# Patient Record
Sex: MALE | Race: WHITE | Employment: UNEMPLOYED | URBAN - METROPOLITAN AREA
[De-identification: names, ages, dates, MRNs, and addresses within clinical notes are randomized per-mention and may not be internally consistent; named-entity substitution may affect disease eponyms.]

---

## 2022-02-06 ENCOUNTER — APPOINTMENT (OUTPATIENT)
Dept: GENERAL RADIOLOGY | Age: 57
DRG: 280 | End: 2022-02-06
Payer: MEDICAID

## 2022-02-06 ENCOUNTER — APPOINTMENT (OUTPATIENT)
Dept: CT IMAGING | Age: 57
DRG: 280 | End: 2022-02-06
Payer: MEDICAID

## 2022-02-06 ENCOUNTER — HOSPITAL ENCOUNTER (INPATIENT)
Age: 57
LOS: 3 days | Discharge: HOME | DRG: 280 | End: 2022-02-09
Attending: EMERGENCY MEDICINE | Admitting: INTERNAL MEDICINE
Payer: MEDICAID

## 2022-02-06 DIAGNOSIS — F10.931 ALCOHOL WITHDRAWAL SYNDROME, WITH DELIRIUM (HCC): ICD-10-CM

## 2022-02-06 DIAGNOSIS — K70.31 ALCOHOLIC CIRRHOSIS OF LIVER WITH ASCITES (HCC): Primary | ICD-10-CM

## 2022-02-06 DIAGNOSIS — R19.7 DIARRHEA, UNSPECIFIED TYPE: ICD-10-CM

## 2022-02-06 PROBLEM — F10.10 ETOH ABUSE: Status: ACTIVE | Noted: 2022-02-06

## 2022-02-06 PROBLEM — I10 PRIMARY HYPERTENSION: Status: ACTIVE | Noted: 2022-02-06

## 2022-02-06 LAB
ALBUMIN SERPL-MCNC: 3 G/DL (ref 3.5–5.2)
ALP BLD-CCNC: 126 U/L (ref 40–129)
ALT SERPL-CCNC: 17 U/L (ref 0–40)
AMMONIA: 24 UMOL/L (ref 16–60)
ANION GAP SERPL CALCULATED.3IONS-SCNC: 11 MMOL/L (ref 7–16)
AST SERPL-CCNC: 67 U/L (ref 0–39)
BASOPHILS ABSOLUTE: 0.07 E9/L (ref 0–0.2)
BASOPHILS RELATIVE PERCENT: 1 % (ref 0–2)
BILIRUB SERPL-MCNC: 2.8 MG/DL (ref 0–1.2)
BUN BLDV-MCNC: 7 MG/DL (ref 6–20)
CALCIUM SERPL-MCNC: 8.6 MG/DL (ref 8.6–10.2)
CHLORIDE BLD-SCNC: 98 MMOL/L (ref 98–107)
CO2: 22 MMOL/L (ref 22–29)
CREAT SERPL-MCNC: 0.7 MG/DL (ref 0.7–1.2)
EOSINOPHILS ABSOLUTE: 0.21 E9/L (ref 0.05–0.5)
EOSINOPHILS RELATIVE PERCENT: 3 % (ref 0–6)
FERRITIN: 1444 NG/ML
GFR AFRICAN AMERICAN: >60
GFR NON-AFRICAN AMERICAN: >60 ML/MIN/1.73
GLUCOSE BLD-MCNC: 105 MG/DL (ref 74–99)
HCT VFR BLD CALC: 39.4 % (ref 37–54)
HEMOGLOBIN: 13.4 G/DL (ref 12.5–16.5)
INR BLD: 1.5
IRON SATURATION: 57 % (ref 20–55)
IRON: 62 MCG/DL (ref 59–158)
LACTIC ACID: 2.6 MMOL/L (ref 0.5–2.2)
LIPASE: 23 U/L (ref 13–60)
LYMPHOCYTES ABSOLUTE: 1.42 E9/L (ref 1.5–4)
LYMPHOCYTES RELATIVE PERCENT: 20 % (ref 20–42)
MCH RBC QN AUTO: 34.7 PG (ref 26–35)
MCHC RBC AUTO-ENTMCNC: 34 % (ref 32–34.5)
MCV RBC AUTO: 102.1 FL (ref 80–99.9)
MONOCYTES ABSOLUTE: 0.57 E9/L (ref 0.1–0.95)
MONOCYTES RELATIVE PERCENT: 8 % (ref 2–12)
NEUTROPHILS ABSOLUTE: 4.83 E9/L (ref 1.8–7.3)
NEUTROPHILS RELATIVE PERCENT: 68 % (ref 43–80)
PDW BLD-RTO: 13.6 FL (ref 11.5–15)
PLATELET # BLD: 146 E9/L (ref 130–450)
PMV BLD AUTO: 10.7 FL (ref 7–12)
POTASSIUM SERPL-SCNC: 4.6 MMOL/L (ref 3.5–5)
PROTHROMBIN TIME: 17.9 SEC (ref 9.3–12.4)
RBC # BLD: 3.86 E12/L (ref 3.8–5.8)
REASON FOR REJECTION: NORMAL
REJECTED TEST: NORMAL
SARS-COV-2, NAAT: NOT DETECTED
SODIUM BLD-SCNC: 131 MMOL/L (ref 132–146)
T4 FREE: 1.09 NG/DL (ref 0.93–1.7)
TOTAL IRON BINDING CAPACITY: 108 MCG/DL (ref 250–450)
TOTAL PROTEIN: 8.5 G/DL (ref 6.4–8.3)
TROPONIN, HIGH SENSITIVITY: 7 NG/L (ref 0–11)
TSH SERPL DL<=0.05 MIU/L-ACNC: 5.35 UIU/ML (ref 0.27–4.2)
VITAMIN D 25-HYDROXY: 38 NG/ML (ref 30–100)
WBC # BLD: 7.1 E9/L (ref 4.5–11.5)

## 2022-02-06 PROCEDURE — 86706 HEP B SURFACE ANTIBODY: CPT

## 2022-02-06 PROCEDURE — 84484 ASSAY OF TROPONIN QUANT: CPT

## 2022-02-06 PROCEDURE — 6370000000 HC RX 637 (ALT 250 FOR IP): Performed by: PHYSICIAN ASSISTANT

## 2022-02-06 PROCEDURE — 82105 ALPHA-FETOPROTEIN SERUM: CPT

## 2022-02-06 PROCEDURE — 85025 COMPLETE CBC W/AUTO DIFF WBC: CPT

## 2022-02-06 PROCEDURE — 87635 SARS-COV-2 COVID-19 AMP PRB: CPT

## 2022-02-06 PROCEDURE — 96374 THER/PROPH/DIAG INJ IV PUSH: CPT

## 2022-02-06 PROCEDURE — 80053 COMPREHEN METABOLIC PANEL: CPT

## 2022-02-06 PROCEDURE — 82390 ASSAY OF CERULOPLASMIN: CPT

## 2022-02-06 PROCEDURE — 6360000002 HC RX W HCPCS: Performed by: PHYSICIAN ASSISTANT

## 2022-02-06 PROCEDURE — C9113 INJ PANTOPRAZOLE SODIUM, VIA: HCPCS | Performed by: PHYSICIAN ASSISTANT

## 2022-02-06 PROCEDURE — 82140 ASSAY OF AMMONIA: CPT

## 2022-02-06 PROCEDURE — 83690 ASSAY OF LIPASE: CPT

## 2022-02-06 PROCEDURE — 82728 ASSAY OF FERRITIN: CPT

## 2022-02-06 PROCEDURE — 6370000000 HC RX 637 (ALT 250 FOR IP): Performed by: EMERGENCY MEDICINE

## 2022-02-06 PROCEDURE — 6360000004 HC RX CONTRAST MEDICATION: Performed by: RADIOLOGY

## 2022-02-06 PROCEDURE — 71045 X-RAY EXAM CHEST 1 VIEW: CPT

## 2022-02-06 PROCEDURE — APPSS60 APP SPLIT SHARED TIME 46-60 MINUTES: Performed by: PHYSICIAN ASSISTANT

## 2022-02-06 PROCEDURE — 1200000000 HC SEMI PRIVATE

## 2022-02-06 PROCEDURE — 6360000002 HC RX W HCPCS: Performed by: EMERGENCY MEDICINE

## 2022-02-06 PROCEDURE — 84439 ASSAY OF FREE THYROXINE: CPT

## 2022-02-06 PROCEDURE — 87350 HEPATITIS BE AG IA: CPT

## 2022-02-06 PROCEDURE — 2580000003 HC RX 258: Performed by: PHYSICIAN ASSISTANT

## 2022-02-06 PROCEDURE — 83605 ASSAY OF LACTIC ACID: CPT

## 2022-02-06 PROCEDURE — 83540 ASSAY OF IRON: CPT

## 2022-02-06 PROCEDURE — 2580000003 HC RX 258: Performed by: EMERGENCY MEDICINE

## 2022-02-06 PROCEDURE — 82103 ALPHA-1-ANTITRYPSIN TOTAL: CPT

## 2022-02-06 PROCEDURE — 80074 ACUTE HEPATITIS PANEL: CPT

## 2022-02-06 PROCEDURE — 74177 CT ABD & PELVIS W/CONTRAST: CPT

## 2022-02-06 PROCEDURE — 86707 HEPATITIS BE ANTIBODY: CPT

## 2022-02-06 PROCEDURE — 99283 EMERGENCY DEPT VISIT LOW MDM: CPT

## 2022-02-06 PROCEDURE — 36415 COLL VENOUS BLD VENIPUNCTURE: CPT

## 2022-02-06 PROCEDURE — 85610 PROTHROMBIN TIME: CPT

## 2022-02-06 PROCEDURE — 93005 ELECTROCARDIOGRAM TRACING: CPT

## 2022-02-06 PROCEDURE — 99223 1ST HOSP IP/OBS HIGH 75: CPT | Performed by: INTERNAL MEDICINE

## 2022-02-06 PROCEDURE — 86038 ANTINUCLEAR ANTIBODIES: CPT

## 2022-02-06 PROCEDURE — 83550 IRON BINDING TEST: CPT

## 2022-02-06 PROCEDURE — 87040 BLOOD CULTURE FOR BACTERIA: CPT

## 2022-02-06 PROCEDURE — 84443 ASSAY THYROID STIM HORMONE: CPT

## 2022-02-06 PROCEDURE — 82306 VITAMIN D 25 HYDROXY: CPT

## 2022-02-06 RX ORDER — 0.9 % SODIUM CHLORIDE 0.9 %
1000 INTRAVENOUS SOLUTION INTRAVENOUS ONCE
Status: COMPLETED | OUTPATIENT
Start: 2022-02-06 | End: 2022-02-06

## 2022-02-06 RX ORDER — PANTOPRAZOLE SODIUM 40 MG/10ML
40 INJECTION, POWDER, LYOPHILIZED, FOR SOLUTION INTRAVENOUS DAILY
Status: DISCONTINUED | OUTPATIENT
Start: 2022-02-06 | End: 2022-02-08

## 2022-02-06 RX ORDER — ONDANSETRON 4 MG/1
4 TABLET, ORALLY DISINTEGRATING ORAL EVERY 8 HOURS PRN
Status: DISCONTINUED | OUTPATIENT
Start: 2022-02-06 | End: 2022-02-09 | Stop reason: HOSPADM

## 2022-02-06 RX ORDER — SPIRONOLACTONE 25 MG/1
100 TABLET ORAL DAILY
Status: DISCONTINUED | OUTPATIENT
Start: 2022-02-06 | End: 2022-02-09 | Stop reason: HOSPADM

## 2022-02-06 RX ORDER — SODIUM CHLORIDE 0.9 % (FLUSH) 0.9 %
5-40 SYRINGE (ML) INJECTION PRN
Status: DISCONTINUED | OUTPATIENT
Start: 2022-02-06 | End: 2022-02-09 | Stop reason: HOSPADM

## 2022-02-06 RX ORDER — SODIUM CHLORIDE 0.9 % (FLUSH) 0.9 %
5-40 SYRINGE (ML) INJECTION EVERY 12 HOURS SCHEDULED
Status: DISCONTINUED | OUTPATIENT
Start: 2022-02-06 | End: 2022-02-09 | Stop reason: HOSPADM

## 2022-02-06 RX ORDER — ONDANSETRON 2 MG/ML
4 INJECTION INTRAMUSCULAR; INTRAVENOUS EVERY 6 HOURS PRN
Status: DISCONTINUED | OUTPATIENT
Start: 2022-02-06 | End: 2022-02-09 | Stop reason: HOSPADM

## 2022-02-06 RX ORDER — LORAZEPAM 2 MG/ML
3 INJECTION INTRAMUSCULAR
Status: DISCONTINUED | OUTPATIENT
Start: 2022-02-06 | End: 2022-02-09 | Stop reason: HOSPADM

## 2022-02-06 RX ORDER — SODIUM CHLORIDE 9 MG/ML
25 INJECTION, SOLUTION INTRAVENOUS PRN
Status: DISCONTINUED | OUTPATIENT
Start: 2022-02-06 | End: 2022-02-09 | Stop reason: HOSPADM

## 2022-02-06 RX ORDER — LORAZEPAM 1 MG/1
4 TABLET ORAL
Status: DISCONTINUED | OUTPATIENT
Start: 2022-02-06 | End: 2022-02-09 | Stop reason: HOSPADM

## 2022-02-06 RX ORDER — FENTANYL CITRATE 50 UG/ML
50 INJECTION, SOLUTION INTRAMUSCULAR; INTRAVENOUS ONCE
Status: COMPLETED | OUTPATIENT
Start: 2022-02-06 | End: 2022-02-06

## 2022-02-06 RX ORDER — LORAZEPAM 2 MG/ML
2 INJECTION INTRAMUSCULAR
Status: DISCONTINUED | OUTPATIENT
Start: 2022-02-06 | End: 2022-02-09 | Stop reason: HOSPADM

## 2022-02-06 RX ORDER — ACETAMINOPHEN 500 MG
500 TABLET ORAL EVERY 8 HOURS PRN
Status: DISCONTINUED | OUTPATIENT
Start: 2022-02-06 | End: 2022-02-09 | Stop reason: HOSPADM

## 2022-02-06 RX ORDER — LORAZEPAM 1 MG/1
1 TABLET ORAL
Status: DISCONTINUED | OUTPATIENT
Start: 2022-02-06 | End: 2022-02-09 | Stop reason: HOSPADM

## 2022-02-06 RX ORDER — FUROSEMIDE 40 MG/1
40 TABLET ORAL DAILY
Status: DISCONTINUED | OUTPATIENT
Start: 2022-02-06 | End: 2022-02-09 | Stop reason: HOSPADM

## 2022-02-06 RX ORDER — LORAZEPAM 1 MG/1
2 TABLET ORAL
Status: DISCONTINUED | OUTPATIENT
Start: 2022-02-06 | End: 2022-02-09 | Stop reason: HOSPADM

## 2022-02-06 RX ORDER — LORAZEPAM 2 MG/ML
1 INJECTION INTRAMUSCULAR
Status: DISCONTINUED | OUTPATIENT
Start: 2022-02-06 | End: 2022-02-09 | Stop reason: HOSPADM

## 2022-02-06 RX ORDER — LORAZEPAM 2 MG/ML
4 INJECTION INTRAMUSCULAR
Status: DISCONTINUED | OUTPATIENT
Start: 2022-02-06 | End: 2022-02-09 | Stop reason: HOSPADM

## 2022-02-06 RX ORDER — LORAZEPAM 1 MG/1
3 TABLET ORAL
Status: DISCONTINUED | OUTPATIENT
Start: 2022-02-06 | End: 2022-02-09 | Stop reason: HOSPADM

## 2022-02-06 RX ORDER — GAUZE BANDAGE 2" X 2"
100 BANDAGE TOPICAL DAILY
Status: DISCONTINUED | OUTPATIENT
Start: 2022-02-06 | End: 2022-02-09 | Stop reason: HOSPADM

## 2022-02-06 RX ORDER — SODIUM CHLORIDE 9 MG/ML
10 INJECTION INTRAVENOUS DAILY
Status: DISCONTINUED | OUTPATIENT
Start: 2022-02-06 | End: 2022-02-08

## 2022-02-06 RX ADMIN — SODIUM CHLORIDE 1000 ML: 9 INJECTION, SOLUTION INTRAVENOUS at 12:08

## 2022-02-06 RX ADMIN — FUROSEMIDE 40 MG: 40 TABLET ORAL at 17:20

## 2022-02-06 RX ADMIN — SPIRONOLACTONE 100 MG: 25 TABLET ORAL at 17:20

## 2022-02-06 RX ADMIN — LORAZEPAM 1 MG: 1 TABLET ORAL at 17:34

## 2022-02-06 RX ADMIN — ACETAMINOPHEN 500 MG: 500 TABLET ORAL at 17:20

## 2022-02-06 RX ADMIN — Medication 25 ML: at 20:32

## 2022-02-06 RX ADMIN — PANTOPRAZOLE SODIUM 40 MG: 40 INJECTION, POWDER, FOR SOLUTION INTRAVENOUS at 17:20

## 2022-02-06 RX ADMIN — LORAZEPAM 1 MG: 1 TABLET ORAL at 20:31

## 2022-02-06 RX ADMIN — SODIUM CHLORIDE, PRESERVATIVE FREE 10 ML: 5 INJECTION INTRAVENOUS at 17:21

## 2022-02-06 RX ADMIN — THIAMINE HCL TAB 100 MG 100 MG: 100 TAB at 17:20

## 2022-02-06 RX ADMIN — FENTANYL CITRATE 50 MCG: 50 INJECTION, SOLUTION INTRAMUSCULAR; INTRAVENOUS at 12:07

## 2022-02-06 RX ADMIN — IOPAMIDOL 75 ML: 755 INJECTION, SOLUTION INTRAVENOUS at 12:46

## 2022-02-06 ASSESSMENT — PAIN SCALES - GENERAL
PAINLEVEL_OUTOF10: 0
PAINLEVEL_OUTOF10: 8
PAINLEVEL_OUTOF10: 3
PAINLEVEL_OUTOF10: 7

## 2022-02-06 ASSESSMENT — ENCOUNTER SYMPTOMS
VOMITING: 0
DIARRHEA: 1
CONSTIPATION: 0
ABDOMINAL PAIN: 1
BLOOD IN STOOL: 0
SHORTNESS OF BREATH: 0
ABDOMINAL DISTENTION: 1
NAUSEA: 0

## 2022-02-06 NOTE — H&P
[address] [place], [place] [address]ist Group   History and Physical      CHIEF COMPLAINT: Abdominal pain    History of Present Illness:  49 y.o. male presents emergency department with complaint of abdominal pain that radiates into his chest.  States it has been present for the past 4 to 5 days. He acknowledges increased amounts of diarrhea. He denies any nausea or vomiting. There is been no blood in his stool. He states no fevers but occasional chills. No previous history of surgeries on the abdomen. He has history of hypertension but does not take his medications. He was to smoking a pack per day. He will not quantify the amount of alcohol but that he has not drunk and 5 to 7 days due to the abdominal pain    Informant(s) for H&P: Patient and EMR    Specific REVIEW OF SYSTEMS of chief complaint:  Pain location:  RLQ  Pain quality: aching and pressure    Pain radiates to:  Chest  Pain severity:  Moderate  Onset quality:  Gradual  Duration:  5 days  Timing:  Constant  Progression:  Worsening  Chronicity:  New  Context: alcohol use    Context: not diet changes, not previous surgeries, not recent illness, not sick contacts and not trauma    Relieved by:  None tried  Worsened by:  Palpation and movement  Associated symptoms: anorexia, chest pain, chills and diarrhea    Associated symptoms: no constipation, no dysuria, no fever, no hematuria, no melena, no nausea, no shortness of breath and no vomiting    Risk factors: has not had multiple surgeries, no NSAID use and not obese       General Review of Systems:  Constitutional: Positive for activity change and chills. Negative for fever. HENT: Negative. Respiratory: Negative for shortness of breath. Cardiovascular: Positive for chest pain. Negative for palpitations and leg swelling. Gastrointestinal: Positive for abdominal distention, abdominal pain, anorexia and diarrhea. Negative for blood in stool, constipation, melena, nausea and vomiting. Genitourinary: Negative for decreased urine volume, dysuria and hematuria. Musculoskeletal: Negative. Skin: Negative. Neurological: Negative for light-headedness. PMH:  Past Medical History:   Diagnosis Date    Alcoholic cirrhosis ([address]     ETOH abuse     Hypertension        Surgical History:  Past Surgical History:   Procedure Laterality Date    HERNIA REPAIR         Medications Prior to Admission:    Prior to Admission medications    Medication Sig Start Date End Date Taking? Authorizing Provider   lisinopril-hydrochlorothiazide (PRINZIDE;ZESTORETIC) 10-12.5 MG per tablet Take 1 tablet by mouth daily for 30 days. 2/26/12 3/27/12  Nargis Dominguez DO       Allergies:    Patient has no known allergies. Social History:    reports that he has been smoking. He has been smoking about 1.00 pack per day. He has never used smokeless tobacco.      Family History:   family history is not on file. PHYSICAL EXAM:  Vitals:  BP (!) 175/90   Pulse 90   Temp 98.4 °F (36.9 °C)   Resp 18   Ht 5' 10\" (1.778 m)   Wt 160 lb (72.6 kg)   SpO2 97%   BMI 22.96 kg/m²   General Appearance: alert and oriented to person, place and time and in no acute distress  Skin: warm and dry  Head: normocephalic and atraumatic  Eyes: pupils equal, round, and reactive to light, extraocular eye movements intact, conjunctivae normal  Neck: neck supple and non tender without mass   Pulmonary/Chest: clear to auscultation bilaterally- no wheezes, rales or rhonchi, normal air movement, no respiratory distress  Cardiovascular: normal rate, normal S1 and S2 and no carotid bruits  Abdomen: soft, right flank is tender, distended, normal bowel sounds, organomegaly with liver palpated 3 fingers below ribs.   There is no fluid wave but tense ascites is noted  Extremities: no cyanosis, no clubbing and no edema  Neurologic: no cranial nerve deficit and speech normal    LABS:  Recent Labs     02/06/22  1142   *   K 4.6   CL 98   CO2 22 BUN 7   CREATININE 0.7   GLUCOSE 105*   CALCIUM 8.6       Recent Labs     02/06/22  1142   WBC 7.1   RBC 3.86   HGB 13.4   HCT 39.4   .1*   MCH 34.7   MCHC 34.0   RDW 13.6      MPV 10.7     No results for input(s): POCGLU in the last 72 hours. Radiology: CT ABDOMEN PELVIS W IV CONTRAST Additional Contrast? None    Result Date: 2/6/2022  EXAMINATION: CT OF THE ABDOMEN AND PELVIS WITH CONTRAST 2/6/2022 12:32 pm TECHNIQUE: CT of the abdomen and pelvis was performed with the administration of intravenous contrast. Multiplanar reformatted images are provided for review. Dose modulation, iterative reconstruction, and/or weight based adjustment of the mA/kV was utilized to reduce the radiation dose to as low as reasonably achievable. COMPARISON: None. HISTORY: ORDERING SYSTEM PROVIDED HISTORY: RLQ pain radiating into chest TECHNOLOGIST PROVIDED HISTORY: Reason for exam:->RLQ pain radiating into chest Additional Contrast?->None Decision Support Exception - unselect if not a suspected or confirmed emergency medical condition->Emergency Medical Condition (MA) FINDINGS: There are findings for advanced liver parenchymal disease as seen with advanced cirrhosis with atrophy of the left lobe, lobularity of the outlines of the liver, hypertrophy of the caudate lobe with signs of portal collateral circulation throughout to the omentum. The main portal vein is patent the but it is not dilated. The main portal vein measures about 9 mm. Gallbladder is normally distended, it appears unremarkable, being partially surrounded by ascites. The biliary tree and pancreatic ductal systems are not dilated. There is unremarkable appearance for the pancreas with normal pattern of enhancement. The spleen has normal size.  Noted is a collapse appearance for the entire colon but with indication for thickening of the bowel wall with some degree of edema from the cecum to the sigmoid colon, the findings are compatible with pancolitis. Uncomplicated diverticulosis seen in the sigmoid colon. Few uncomplicated diverticula is seen in the right-sided colon. There is no specific involvement of the area of the rectum. The appendix is identified with some thickening of the wall which appears to be part of the same process affecting the ascending colon. The small bowel segments are group the towards the center of the abdomen to the large volume ascites. Adrenals not enlarged. Kidneys are preserved size and cortical thickness. The there is normal enhancement for the kidneys. There is no obstruction of the kidneys. There is no renal calculus. Calcified atheromatous changes are seen throughout the abdominal aorta but there is no aneurysm formation, there is more a pattern of aortic occlusive process in the distal abdominal aorta with areas of intramural plaque formation or chronic intramural dissection distally. Areas of stenosis are seen in the origin of both common iliac arteries with have calcifications in that area. Atherosclerotic changes are seen in the, iliac arteries and superficial femoral arteries bilaterally. The bladder has unremarkable appearance. Prostate gland and seminal vesicles appear unremarkable. Small left-sided pleural effusions are identified in the lower lung bases. Visualized bones demonstrate mild degenerative changes in the thoracolumbar spine. 1.  Findings for advanced liver cirrhosis with large volume of ascites and portal collateral circulation. Decompensated liver cirrhosis considered. 2.  Findings for pancolitis from the cecal area through the sigmoid colon. Uncomplicated diverticula formation seen in the colon, mainly in the sigmoid colon. 3.  Calcified atherosclerotic changes in the abdominal aorta and iliac arterial systems bilaterally without aneurysm formation but with a pattern of developing aortic occlusive process.      XR CHEST PORTABLE    Result Date: 2/6/2022  EXAMINATION: ONE XRAY VIEW OF THE CHEST 2/6/2022 12:08 pm COMPARISON: None. HISTORY: ORDERING SYSTEM PROVIDED HISTORY: SOB/CP TECHNOLOGIST PROVIDED HISTORY: Reason for exam:->SOB/CP FINDINGS: There is minimal basilar subsegmental atelectasis on the left. There is a density in the right lower lung possibly associated with nipple shadow or the anterior right 6th rib. No abnormality in the right lower lung on CT abdomen of 02/06/2022. heart is normal in size. No alveolar consolidation identified. Mild left basilar subsegmental atelectasis. No other acute process detected. EKG: Pending    ASSESSMENT:      Active Problems:    Alcohol withdrawal delirium (HCC)    Alcoholic cirrhosis of liver with ascites (HCC)    ETOH abuse    Primary hypertension  Resolved Problems:    * No resolved hospital problems. *      PLAN:    1. Abdominal pain with advanced alcoholic liver cirrhosis as well as significant tense ascites:  -CT of the abdomen pelvis with findings as above as well as portal collateral circulation  -Clinically he has early decompensated liver cirrhosis  -Gastrointestinal specialist consultation  -Patient states he is never had a work-up for his cirrhosis  -Hepatitis profile PCR, cultures, ferritin, iron and TIBC, alpha-1 antitrypsin, ceruloplasmin, AFP, TETE  -Consult IR for paracentesis  -We will initiate Lasix and Aldactone orally    2. Abnormal liver function test, bleeding time and transaminitis:  -Follow serially bilirubin 2.8--->     AST 67--->  -Elevated Bleeding time, pro time 17.9, INR 1.5    3. Pancolitis:  -CT shows from the cecal area to the sigmoid colon  -GI consulted  -Change diet to bariatric diet with clear liquids    4. Significant calcified atherosclerosis:  -CT noted findings in the abdominal aorta or common the iliac arterial systems bilaterally without aneurysm  -We'll hold off on statin until cleared by GI    5.    Hypertension poorly controlled:  -Diuresis has been initiated with Aldactone and Lasix will follow closely    6. Alcohol withdrawal delirium:  -Per the patient's brother he has been having excessive diarrhea as well and has visual hallucinations  -[facility] protocol    Code Status: Full code  DVT prophylaxis: Elevated bleeding time secondary to cirrhosis    NOTE: This report was transcribed using voice recognition software. Every effort was made to ensure accuracy; however, inadvertent computerized transcription errors may be present.      Electronically signed by Narda De La Rosa, Ph.D. on 2/6/2022 at 4:30 PM

## 2022-02-06 NOTE — ED PROVIDER NOTES
Presents emergency department with complaint of abdominal pain that radiates into his chest.  States it has been present for the past 4 to 5 days. He acknowledges increased amounts of diarrhea. He denies any nausea or vomiting. There is been no blood in his stool. He states no fevers but occasional chills. No previous history of surgeries on the abdomen. He has history of hypertension but does not take his medications. He was to smoking a pack per day. He states that drinking is \"here and there\". The history is provided by the patient. Abdominal Pain  Pain location:  RLQ  Pain quality: aching and pressure    Pain radiates to:  Chest  Pain severity:  Moderate  Onset quality:  Gradual  Duration:  5 days  Timing:  Constant  Progression:  Worsening  Chronicity:  New  Context: alcohol use    Context: not diet changes, not previous surgeries, not recent illness, not sick contacts and not trauma    Relieved by:  None tried  Worsened by:  Palpation and movement  Associated symptoms: anorexia, chest pain, chills and diarrhea    Associated symptoms: no constipation, no dysuria, no fever, no hematuria, no melena, no nausea, no shortness of breath and no vomiting    Risk factors: has not had multiple surgeries, no NSAID use and not obese        Review of Systems   Constitutional: Positive for activity change and chills. Negative for fever. HENT: Negative. Respiratory: Negative for shortness of breath. Cardiovascular: Positive for chest pain. Negative for palpitations and leg swelling. Gastrointestinal: Positive for abdominal distention, abdominal pain, anorexia and diarrhea. Negative for blood in stool, constipation, melena, nausea and vomiting. Genitourinary: Negative for decreased urine volume, dysuria and hematuria. Musculoskeletal: Negative. Skin: Negative. Neurological: Negative for light-headedness. Physical Exam  Vitals and nursing note reviewed.    Constitutional:       General: He is not in acute distress. Appearance: He is well-developed and normal weight. He is ill-appearing. He is not toxic-appearing. HENT:      Head: Normocephalic and atraumatic. Eyes:      Extraocular Movements: Extraocular movements intact. Pupils: Pupils are equal, round, and reactive to light. Cardiovascular:      Rate and Rhythm: Normal rate and regular rhythm. Heart sounds: Normal heart sounds. No murmur heard. Pulmonary:      Effort: Pulmonary effort is normal. No respiratory distress. Breath sounds: Normal breath sounds. No wheezing or rales. Abdominal:      General: Bowel sounds are normal. There is distension. Palpations: Abdomen is soft. Tenderness: There is abdominal tenderness in the right lower quadrant. There is left CVA tenderness. There is no right CVA tenderness, guarding or rebound. Hernia: No hernia is present. Musculoskeletal:      Cervical back: Normal range of motion and neck supple. Skin:     General: Skin is warm and dry. Capillary Refill: Capillary refill takes less than 2 seconds. Coloration: Skin is not pale. Neurological:      General: No focal deficit present. Mental Status: He is alert and oriented to person, place, and time. Cranial Nerves: No cranial nerve deficit. Coordination: Coordination normal.         Procedures    [facility]    ED Course as of 02/06/22 1340   Sun Feb 06, 2022   1257 Patient states he has had some relief in pain. We are awaiting results of CT scan and redraw of troponin. [JS]   5 His brother is now present and pulls me aside. He states the patient is an alcoholic and hasn't been able to drink in the last few days. He reports the patient is having visual hallucinations. He states the diarrhea is perfuse and he's been trying to keep the patient hydrated.  [JS]      ED Course User Index  [JS] Lizbeth Bartlett DO     EKG Interpretation    Interpreted by emergency department physician    Rhythm: sinus tachycardia  Rate: 110-120  Axis: normal  Ectopy: none  Conduction: normal  ST Segments: no acute change  T Waves: no acute change  Q Waves: III    Clinical Impression: sinus tachycardia, no old for comparison    Braxton Post, DO  ED Course as of 02/06/22 1340   Sun Feb 06, 2022   1257 Patient states he has had some relief in pain. We are awaiting results of CT scan and redraw of troponin. [JS]   5 His brother is now present and pulls me aside. He states the patient is an alcoholic and hasn't been able to drink in the last few days. He reports the patient is having visual hallucinations. He states the diarrhea is perfuse and he's been trying to keep the patient hydrated. [JS]      ED Course User Index  [JS] Braxton Been, DO       --------------------------------------------- PAST HISTORY ---------------------------------------------  Past Medical History:  has a past medical history of Hypertension. Past Surgical History:  has no past surgical history on file. Social History:  reports that he has been smoking. He has been smoking about 1.00 pack per day. He does not have any smokeless tobacco history on file. Family History: family history is not on file. The patients home medications have been reviewed. Allergies: Patient has no known allergies.     -------------------------------------------------- RESULTS -------------------------------------------------    Lab  Results for orders placed or performed during the hospital encounter of 02/06/22   CBC Auto Differential   Result Value Ref Range    WBC 7.1 4.5 - 11.5 E9/L    RBC 3.86 3.80 - 5.80 E12/L    Hemoglobin 13.4 12.5 - 16.5 g/dL    Hematocrit 39.4 37.0 - 54.0 %    .1 (H) 80.0 - 99.9 fL    MCH 34.7 26.0 - 35.0 pg    MCHC 34.0 32.0 - 34.5 %    RDW 13.6 11.5 - 15.0 fL    Platelets 272 165 - 809 E9/L    MPV 10.7 7.0 - 12.0 fL    Neutrophils % 68.0 43.0 - 80.0 %    Lymphocytes % 20.0 20.0 - 42.0 %    Monocytes % 8.0 2.0 - 12.0 %    Eosinophils % 3.0 0.0 - 6.0 %    Basophils % 1.0 0.0 - 2.0 %    Neutrophils Absolute 4.83 1.80 - 7.30 E9/L    Lymphocytes Absolute 1.42 (L) 1.50 - 4.00 E9/L    Monocytes Absolute 0.57 0.10 - 0.95 E9/L    Eosinophils Absolute 0.21 0.05 - 0.50 E9/L    Basophils Absolute 0.07 0.00 - 0.20 E9/L   Comprehensive Metabolic Panel   Result Value Ref Range    Sodium 131 (L) 132 - 146 mmol/L    Potassium 4.6 3.5 - 5.0 mmol/L    Chloride 98 98 - 107 mmol/L    CO2 22 22 - 29 mmol/L    Anion Gap 11 7 - 16 mmol/L    Glucose 105 (H) 74 - 99 mg/dL    BUN 7 6 - 20 mg/dL    CREATININE 0.7 0.7 - 1.2 mg/dL    GFR Non-African American >60 >=60 mL/min/1.73    GFR African American >60     Calcium 8.6 8.6 - 10.2 mg/dL    Total Protein 8.5 (H) 6.4 - 8.3 g/dL    Albumin 3.0 (L) 3.5 - 5.2 g/dL    Total Bilirubin 2.8 (H) 0.0 - 1.2 mg/dL    Alkaline Phosphatase 126 40 - 129 U/L    ALT 17 0 - 40 U/L    AST 67 (H) 0 - 39 U/L   Lipase   Result Value Ref Range    Lipase 23 13 - 60 U/L   Lactic Acid, Plasma   Result Value Ref Range    Lactic Acid 2.6 (H) 0.5 - 2.2 mmol/L   SPECIMEN REJECTION   Result Value Ref Range    Rejected Test trp5     Reason for Rejection see below    Troponin   Result Value Ref Range    Troponin, High Sensitivity 7 0 - 11 ng/L       Radiology  CT ABDOMEN PELVIS W IV CONTRAST Additional Contrast? None   Final Result   1. Findings for advanced liver cirrhosis with large volume of ascites and   portal collateral circulation. Decompensated liver cirrhosis considered. 2.  Findings for pancolitis from the cecal area through the sigmoid colon. Uncomplicated diverticula formation seen in the colon, mainly in the sigmoid   colon. 3.  Calcified atherosclerotic changes in the abdominal aorta and iliac   arterial systems bilaterally without aneurysm formation but with a pattern of   developing aortic occlusive process.          XR CHEST PORTABLE   Final Result   Mild left basilar subsegmental atelectasis. No other acute process detected. EKG:  This EKG is signed and interpreted by me.      ------------------------- NURSING NOTES AND VITALS REVIEWED ---------------------------  Date / Time Roomed:  2/6/2022 11:27 AM  ED Bed Assignment:  13/13    The nursing notes within the ED encounter and vital signs as below have been reviewed. Patient Vitals for the past 24 hrs:   BP Temp Temp src Pulse Resp SpO2 Height Weight   02/06/22 1314 (!) 171/104 -- -- 104 20 97 % -- --   02/06/22 1121 (!) 212/116 -- -- -- -- -- -- --   02/06/22 1120 -- -- -- 126 -- -- -- --   02/06/22 1116 -- 96.7 °F (35.9 °C) Infrared 113 16 99 % 5' 10\" (1.778 m) 160 lb (72.6 kg)       Oxygen Saturation Interpretation: Normal      ------------------------------------------ PROGRESS NOTES ------------------------------------------  Re-evaluation(s):  Time: 13:40. Patients symptoms show no change  Repeat physical examination is not changed      I have spoken with the patient and brother and discussed todays results, in addition to providing specific details for the plan of care and counseling regarding the diagnosis and prognosis. Their questions are answered at this time and they are agreeable with the plan.      --------------------------------- ADDITIONAL PROVIDER NOTES ---------------------------------  Consultations:  Spoke with Dr. Bry Hale,  They will admit this patient. This patient's ED course included: a personal history and physicial examination and multiple bedside re-evaluations    This patient has remained hemodynamically stable and been closely monitored during their ED course. Please note that the withdrawal or failure to initiate urgent interventions for this patient would likely result in a life threatening deterioration or permanent disability. Accordingly this patient received 0 minutes of critical care time, excluding separately billable procedures. Clinical Impression  1. Alcoholic cirrhosis of liver with ascites ([facility] [address] [address]    2. [facility] withdrawal syndrome, with [facility]    3. Diarrhea, unspecified type          Disposition  Patient's disposition: Admit to telemetry  Patient's condition is stable.        Sayra Yoon DO  02/06/22 1345

## 2022-02-07 ENCOUNTER — APPOINTMENT (OUTPATIENT)
Dept: INTERVENTIONAL RADIOLOGY/VASCULAR | Age: 57
DRG: 280 | End: 2022-02-07
Payer: MEDICAID

## 2022-02-07 LAB
ALBUMIN FLUID: 0.7 G/DL
ALBUMIN SERPL-MCNC: 2.1 G/DL (ref 3.5–5.2)
ALP BLD-CCNC: 116 U/L (ref 40–129)
ALT SERPL-CCNC: 12 U/L (ref 0–40)
ANION GAP SERPL CALCULATED.3IONS-SCNC: 8 MMOL/L (ref 7–16)
ANTI-NUCLEAR ANTIBODY (ANA): NEGATIVE
APPEARANCE FLUID: NORMAL
AST SERPL-CCNC: 36 U/L (ref 0–39)
BILIRUB SERPL-MCNC: 1.6 MG/DL (ref 0–1.2)
BUN BLDV-MCNC: 6 MG/DL (ref 6–20)
CALCIUM SERPL-MCNC: 7.9 MG/DL (ref 8.6–10.2)
CELL COUNT FLUID TYPE: NORMAL
CHLORIDE BLD-SCNC: 103 MMOL/L (ref 98–107)
CO2: 23 MMOL/L (ref 22–29)
COLOR FLUID: YELLOW
CREAT SERPL-MCNC: 0.7 MG/DL (ref 0.7–1.2)
EKG ATRIAL RATE: 116 BPM
EKG P AXIS: 59 DEGREES
EKG P-R INTERVAL: 166 MS
EKG Q-T INTERVAL: 342 MS
EKG QRS DURATION: 94 MS
EKG QTC CALCULATION (BAZETT): 475 MS
EKG R AXIS: 13 DEGREES
EKG T AXIS: 49 DEGREES
EKG VENTRICULAR RATE: 116 BPM
GFR AFRICAN AMERICAN: >60
GFR NON-AFRICAN AMERICAN: >60 ML/MIN/1.73
GLUCOSE BLD-MCNC: 78 MG/DL (ref 74–99)
GLUCOSE, FLUID: 98 MG/DL
HAV IGM SER IA-ACNC: NORMAL
HBV SURFACE AB TITR SER: NORMAL {TITER}
HCT VFR BLD CALC: 34.5 % (ref 37–54)
HEMOGLOBIN: 11.8 G/DL (ref 12.5–16.5)
HEPATITIS B CORE IGM ANTIBODY: NORMAL
HEPATITIS B SURFACE ANTIGEN INTERPRETATION: NORMAL
HEPATITIS C ANTIBODY INTERPRETATION: NORMAL
INR BLD: 1.6
LACTATE DEHYDROGENASE: 140 U/L (ref 135–225)
LD, FLUID: 53 U/L
MAGNESIUM: 1.7 MG/DL (ref 1.6–2.6)
MCH RBC QN AUTO: 34.7 PG (ref 26–35)
MCHC RBC AUTO-ENTMCNC: 34.2 % (ref 32–34.5)
MCV RBC AUTO: 101.5 FL (ref 80–99.9)
MONOCYTE, FLUID: 88 %
NEUTROPHIL, FLUID: 12 %
NUCLEATED CELLS FLUID: 106 /UL
PDW BLD-RTO: 13.5 FL (ref 11.5–15)
PHOSPHORUS: 2.9 MG/DL (ref 2.5–4.5)
PLATELET # BLD: 129 E9/L (ref 130–450)
PMV BLD AUTO: 10.8 FL (ref 7–12)
POTASSIUM REFLEX MAGNESIUM: 3.4 MMOL/L (ref 3.5–5)
PROTEIN FLUID: 2.2 G/DL
PROTHROMBIN TIME: 19.2 SEC (ref 9.3–12.4)
RBC # BLD: 3.4 E12/L (ref 3.8–5.8)
RBC FLUID: <2000 /UL
SODIUM BLD-SCNC: 134 MMOL/L (ref 132–146)
TOTAL PROTEIN: 6.8 G/DL (ref 6.4–8.3)
WBC # BLD: 6.4 E9/L (ref 4.5–11.5)

## 2022-02-07 PROCEDURE — 84157 ASSAY OF PROTEIN OTHER: CPT

## 2022-02-07 PROCEDURE — 82150 ASSAY OF AMYLASE: CPT

## 2022-02-07 PROCEDURE — 6360000002 HC RX W HCPCS: Performed by: PHYSICIAN ASSISTANT

## 2022-02-07 PROCEDURE — 84100 ASSAY OF PHOSPHORUS: CPT

## 2022-02-07 PROCEDURE — 36415 COLL VENOUS BLD VENIPUNCTURE: CPT

## 2022-02-07 PROCEDURE — 2580000003 HC RX 258: Performed by: PHYSICIAN ASSISTANT

## 2022-02-07 PROCEDURE — C1729 CATH, DRAINAGE: HCPCS

## 2022-02-07 PROCEDURE — APPSS30 APP SPLIT SHARED TIME 16-30 MINUTES: Performed by: NURSE PRACTITIONER

## 2022-02-07 PROCEDURE — 85610 PROTHROMBIN TIME: CPT

## 2022-02-07 PROCEDURE — 83615 LACTATE (LD) (LDH) ENZYME: CPT

## 2022-02-07 PROCEDURE — 82947 ASSAY GLUCOSE BLOOD QUANT: CPT

## 2022-02-07 PROCEDURE — 99232 SBSQ HOSP IP/OBS MODERATE 35: CPT | Performed by: INTERNAL MEDICINE

## 2022-02-07 PROCEDURE — 6360000002 HC RX W HCPCS: Performed by: INTERNAL MEDICINE

## 2022-02-07 PROCEDURE — 89051 BODY FLUID CELL COUNT: CPT

## 2022-02-07 PROCEDURE — 6370000000 HC RX 637 (ALT 250 FOR IP): Performed by: NURSE PRACTITIONER

## 2022-02-07 PROCEDURE — 87205 SMEAR GRAM STAIN: CPT

## 2022-02-07 PROCEDURE — 6370000000 HC RX 637 (ALT 250 FOR IP): Performed by: EMERGENCY MEDICINE

## 2022-02-07 PROCEDURE — 88112 CYTOPATH CELL ENHANCE TECH: CPT

## 2022-02-07 PROCEDURE — 87070 CULTURE OTHR SPECIMN AEROBIC: CPT

## 2022-02-07 PROCEDURE — 0W9G3ZZ DRAINAGE OF PERITONEAL CAVITY, PERCUTANEOUS APPROACH: ICD-10-PCS | Performed by: RADIOLOGY

## 2022-02-07 PROCEDURE — 6370000000 HC RX 637 (ALT 250 FOR IP): Performed by: PHYSICIAN ASSISTANT

## 2022-02-07 PROCEDURE — C9113 INJ PANTOPRAZOLE SODIUM, VIA: HCPCS | Performed by: PHYSICIAN ASSISTANT

## 2022-02-07 PROCEDURE — 85027 COMPLETE CBC AUTOMATED: CPT

## 2022-02-07 PROCEDURE — 0W9B3ZZ DRAINAGE OF LEFT PLEURAL CAVITY, PERCUTANEOUS APPROACH: ICD-10-PCS | Performed by: PHYSICIAN ASSISTANT

## 2022-02-07 PROCEDURE — 97161 PT EVAL LOW COMPLEX 20 MIN: CPT

## 2022-02-07 PROCEDURE — A4216 STERILE WATER/SALINE, 10 ML: HCPCS | Performed by: PHYSICIAN ASSISTANT

## 2022-02-07 PROCEDURE — 83735 ASSAY OF MAGNESIUM: CPT

## 2022-02-07 PROCEDURE — 2580000003 HC RX 258: Performed by: EMERGENCY MEDICINE

## 2022-02-07 PROCEDURE — 1200000000 HC SEMI PRIVATE

## 2022-02-07 PROCEDURE — 86255 FLUORESCENT ANTIBODY SCREEN: CPT

## 2022-02-07 PROCEDURE — P9047 ALBUMIN (HUMAN), 25%, 50ML: HCPCS | Performed by: INTERNAL MEDICINE

## 2022-02-07 PROCEDURE — 49083 ABD PARACENTESIS W/IMAGING: CPT

## 2022-02-07 PROCEDURE — 82042 OTHER SOURCE ALBUMIN QUAN EA: CPT

## 2022-02-07 PROCEDURE — 6370000000 HC RX 637 (ALT 250 FOR IP): Performed by: INTERNAL MEDICINE

## 2022-02-07 PROCEDURE — 97530 THERAPEUTIC ACTIVITIES: CPT

## 2022-02-07 PROCEDURE — 81256 HFE GENE: CPT

## 2022-02-07 PROCEDURE — 88305 TISSUE EXAM BY PATHOLOGIST: CPT

## 2022-02-07 PROCEDURE — 80053 COMPREHEN METABOLIC PANEL: CPT

## 2022-02-07 RX ORDER — POTASSIUM CHLORIDE 20 MEQ/1
20 TABLET, EXTENDED RELEASE ORAL ONCE
Status: COMPLETED | OUTPATIENT
Start: 2022-02-07 | End: 2022-02-07

## 2022-02-07 RX ORDER — ALBUMIN (HUMAN) 12.5 G/50ML
50 SOLUTION INTRAVENOUS ONCE
Status: COMPLETED | OUTPATIENT
Start: 2022-02-07 | End: 2022-02-07

## 2022-02-07 RX ORDER — ATENOLOL 25 MG/1
25 TABLET ORAL DAILY
Status: DISCONTINUED | OUTPATIENT
Start: 2022-02-07 | End: 2022-02-09 | Stop reason: HOSPADM

## 2022-02-07 RX ADMIN — ATENOLOL 25 MG: 25 TABLET ORAL at 08:52

## 2022-02-07 RX ADMIN — Medication 10 ML: at 20:04

## 2022-02-07 RX ADMIN — SPIRONOLACTONE 100 MG: 25 TABLET ORAL at 08:51

## 2022-02-07 RX ADMIN — ALBUMIN (HUMAN) 50 G: 0.25 INJECTION, SOLUTION INTRAVENOUS at 08:54

## 2022-02-07 RX ADMIN — Medication 10 ML: at 09:51

## 2022-02-07 RX ADMIN — POTASSIUM CHLORIDE 20 MEQ: 1500 TABLET, EXTENDED RELEASE ORAL at 08:52

## 2022-02-07 RX ADMIN — THIAMINE HCL TAB 100 MG 100 MG: 100 TAB at 08:52

## 2022-02-07 RX ADMIN — LORAZEPAM 1 MG: 1 TABLET ORAL at 05:11

## 2022-02-07 RX ADMIN — FUROSEMIDE 40 MG: 40 TABLET ORAL at 08:52

## 2022-02-07 RX ADMIN — ALBUMIN (HUMAN) 50 G: 0.25 INJECTION, SOLUTION INTRAVENOUS at 16:34

## 2022-02-07 RX ADMIN — SODIUM CHLORIDE, PRESERVATIVE FREE 10 ML: 5 INJECTION INTRAVENOUS at 09:51

## 2022-02-07 RX ADMIN — ACETAMINOPHEN 500 MG: 500 TABLET ORAL at 16:35

## 2022-02-07 RX ADMIN — PANTOPRAZOLE SODIUM 40 MG: 40 INJECTION, POWDER, FOR SOLUTION INTRAVENOUS at 08:51

## 2022-02-07 ASSESSMENT — PAIN SCALES - GENERAL
PAINLEVEL_OUTOF10: 0
PAINLEVEL_OUTOF10: 5
PAINLEVEL_OUTOF10: 0
PAINLEVEL_OUTOF10: 0

## 2022-02-07 NOTE — PROGRESS NOTES
Physical Therapy Initial Evaluation/Plan of Care    Room #:  9705/3373-08  Patient Name: Blaen San  YOB: 1965  MRN: [id number]    Date of Service: 2/7/2022     Tentative placement recommendation: drug/alcohol rehab   Equipment recommendation: To be determined      Evaluating Physical Therapist: Ruby Chavez #[id number]      Specific Provider Orders/Date/Referring Provider :   02/07/22 1245   PT eval and treat Start: 02/07/22 1245, End: 02/07/22 1245, ONE TIME, Standing Count: 1 Occurrences, R    Luiza Half, APRN - CNP      Admitting Diagnosis:   Alcohol withdrawal delirium ([facility] [address] [M75.500]  Alcoholic cirrhosis of liver with ascites ([address] [K70.31]  Alcohol withdrawal syndrome, with delirium ([address] [F10.231]  Diarrhea, unspecified type [R19.7]      Surgery:paracentesis 2/7/22      Visit Diagnoses       Codes    Alcohol withdrawal syndrome, with delirium ([address]     F10.231    Diarrhea, unspecified type     R19.7          Patient Active Problem List   Diagnosis    Alcohol withdrawal delirium ([address]    Alcoholic cirrhosis of liver with ascites ([address]    ETOH abuse    Primary hypertension        ASSESSMENT of Current Deficits Patient exhibits decreased balance and endurance impairing transfers and gait distance. Patient unsteady at  first standing up but able to maintain balance without assistance, ambulated with supervision. Patient does have 14 stairs to second level. The patient will benefit from continued skilled therapy to increase strength and improve balance for safe functional mobility, to decrease risk of falls, and to meet goals at discharge.       PHYSICAL THERAPY  PLAN OF CARE       Physical therapy plan of care is established based on physician order,  patient diagnosis and clinical assessment    Current Treatment Recommendations:    -Standing Balance: Perform strengthening exercises in standing to promote motor control with or without upper extremity support   -Transfers: Provide instruction on proper hand and foot position for adequate transfer of weight onto lower extremities and use of gait device if needed and Cues for hand placement, technique and safety. Provide stabilization to prevent fall   -Gait: Gait training and Standing activities to improve: base of support, weight shift, weight bearing    -Endurance: Utilize Supervised activities to increase level of endurance to allow for safe functional mobility including transfers and gait   -Stairs: Stair training with instruction on proper technique and hand placement on rail    PT long term treatment goals are located in below grid    Patient and or family understand(s) diagnosis, prognosis, and plan of care. Frequency of treatments: Patient will be seen  daily.          Prior Level of Function: Patient ambulated independently   Rehab Potential: good  for baseline    Past medical history:   Past Medical History:   Diagnosis Date    Alcoholic cirrhosis ([facility] [address]     ETOH abuse     Hypertension      Past Surgical History:   Procedure Laterality Date    HERNIA REPAIR         SUBJECTIVE:    Precautions: Up as tolerated, falls , alcohol abuse   Social history: Patient lives with brother (who lives there some times) in a two story home bedroom and bathroom 2nd floor 14 steps  with 6 steps  to enter with Rail  Tub shower     Equipment owned: None,      [address] [place] [address],[address] Mobility Inpatient   How much difficulty turning over in bed?: None  How much difficulty sitting down on / standing up from a chair with arms?: None  How much difficulty moving from lying on back to sitting on side of bed?: None  How much help from another person moving to and from a bed to a chair?: A Little  How much help from another person needed to walk in hospital room?: A Little  How much help from another person for climbing 3-5 steps with a railing?: A Little  AM-[facility] Inpatient Mobility Raw Score : 21  AM-PAC Inpatient T-Scale Score : 50.25  Mobility Inpatient CMS 0-100% Score: 28.97  Mobility Inpatient CMS G-Code Modifier : [address] cleared patient for PT evaluation. The admitting diagnosis and active problem list as listed above have been reviewed prior to the initiation of this evaluation. OBJECTIVE;   Initial Evaluation  Date: 2/7/2022 Treatment Date:     Short Term/ Long Term   Goals   Was pt agreeable to Eval/treatment? Yes  To be met in 5 days   Pain level   5/10  Right side pain      Bed Mobility    Rolling: Independent    Supine to sit:  Independent    Sit to supine: Independent    Scooting: Independent       Transfers Sit to stand: Supervision    Sit to stand: Independent    Ambulation    2x20, 50 feet using  no device with Supervision    for safety   150 feet using  no device with Independent    Stair negotiation: ascended and descended   Not assessed     10 steps with HR independence    ROM Within functional limits       Strength BUE:  5/5  RLE:  4+/5  LLE:  4+/5  Increase strength in affected mm groups by 1/3 grade   Balance Sitting EOB:  good   Dynamic Standing:  good -  Sitting EOB:  good   Dynamic Standing: good      Patient is Alert & Oriented x person, place, time and situation and follows directions    Sensation:  Patient  denies numbness/tingling   Edema:  no   Endurance: fair     Vitals: room air   Blood Pressure at rest  Blood Pressure during session    Heart Rate at rest 74 Heart Rate during session    SPO2 at rest 99%  SPO2 during session 96-98%     Patient education  Patient educated on role of Physical Therapy, risks of immobility, safety and plan of care,  importance of mobility while in hospital , ankle pumps, quad set and glut set for edema control, blood clot prevention and safety      Patient response to education:   Pt verbalized understanding Pt demonstrated skill Pt requires further education in this area   Yes Partial Yes      Treatment:  Patient practiced and was instructed/facilitated in the following treatment: Patient assisted to EOB. Sat edge of bed 15 minutes with Supervision  to increase dynamic sitting balance and activity tolerance. Ambulated as above. Patient assisted back to EOB. Therapeutic Exercises:  not performed  x  reps. At end of session, patient in bed with  call light and phone within reach,  all lines and tubes intact, nursing notified. Patient would benefit from continued skilled Physical Therapy to improve functional independence and quality of life. Patient's/ family goals   home    Time in  1508  Time out  1538    Total Treatment Time  10 minutes    Evaluation time includes thorough review of current medical information, gathering information on past medical history/social history and prior level of function, completion of standardized testing/informal observation of tasks, assessment of data, and development of Plan of care and goals.      CPT codes:  Low Complexity PT evaluation (19966)  Therapeutic activities (22315)   10 minutes  1 unit(s)    Shannan Che, PT

## 2022-02-07 NOTE — PLAN OF CARE
Problem: Falls - Risk of:  Goal: Will remain free from falls  Description: Will remain free from falls  2/7/2022 1137 by Maci Ruelas RN  Outcome: Met This Shift  2/6/2022 2223 by David Maloney RN  Outcome: Met This Shift  Goal: Absence of physical injury  Description: Absence of physical injury  2/7/2022 1137 by Maci Ruelas RN  Outcome: Met This Shift  2/6/2022 2223 by David Maloney RN  Outcome: Met This Shift

## 2022-02-07 NOTE — CARE COORDINATION
2/7/2022: SS Note/Discharge plan:  SS Consult noted for \"consideration of rehab\" \" longstanding alcoholism leading to cirrhosis with ascites, referrals for treatment\"  Met with pt supportively and regarding consult, pt was sleeping upon arrival but did wake up and was agreeable to speaking with sw, he admits to daily alcohol use and to attending Rolene Haseeb meetings in the past. Pt currently unsure about going into a treatment program and declined wanting to speak with [facility] ([facility] at this time, pt says he will \"think about it\". [facility] accept [facility]. Sw will follow through discharge for continued support and to make a [facility] referral when and if pt agreeable, nursing informed.  Electronically signed by ANDREWS Weber on 2/7/2022 at 1:09 PM

## 2022-02-07 NOTE — CONSULTS
[facility]  Dr. Hansa Bennett M.D., Dr. Jessica Melendez M.D., FERN JerniganO., Dr. Will Plascencia M.D., FERN GrajedaO. Patient Name: Hector Donaldson  MRN: [id number]  : 1965 (49 y.o. male)  Allergies: has No Known Allergies. Date of Service: 2022       Reason for Consultation:  Cirrhosis and ascites    HISTORY OF PRESENT ILLNESS:      The patient is a 49 y.o. male who presents with abdominal pain, abdominal distension, nausea, vomiting, and diarrhea. His brother is in the room also providing history. Girish reports lonstanding alcohol use disorder. He does not specifically quantify how much he drinks each day, but does drink daily, beer, at least 4-6 cans daily based on the rough and wandering estimates he gives. He denies drug use. He does smoke 1/2 PPD. He has quit drinking in the past, but not recently. He has never had withdrawal.    Patient is unaware of any history of liver issues. He and his brother report that his abdominal swelling is only 1-2 months old. Brother reports that he has been \"sick\" more recently, especially in the past couple months, and acutely worsening in the past week. Patient denies hematemesis, hematochezia, or melena. He has been having diarrhea for about 5 days, but no diarrhea since admission. He does have some abdominal pain, on his right side. He does occasionally get diarrea, nausea, and vomiting. This abdominal pain is new. He denies recent travel, sick contacts, or recent antibiotics. He endorses that he is ready to quit drinking alcohol.     REVIEW OF SYSTEMS:   Review of Systems   12 point ROS obtained and negative except as in HPI    Past Medical History:  Past Medical History:   Diagnosis Date    Alcoholic cirrhosis ([address]     ETOH abuse     Hypertension        Past Surgical History:  Past Surgical History:   Procedure Laterality Date    HERNIA REPAIR         Home Medications:  Prior to Admission medications    Medication Sig Start Date End Date Taking? Authorizing Provider   lisinopril-hydrochlorothiazide (PRINZIDE;ZESTORETIC) 10-12.5 MG per tablet Take 1 tablet by mouth daily for 30 days. 2/26/12 3/27/12  Sondra Flatness, DO       Allergies: Patient has no known allergies. Social History:  Social History     Socioeconomic History    Marital status:      Spouse name: Not on file    Number of children: Not on file    Years of education: Not on file    Highest education level: Not on file   Occupational History    Not on file   Tobacco Use    Smoking status: Current Every Day Smoker     Packs/day: 1.00    Smokeless tobacco: Never Used   Vaping Use    Vaping Use: Never used   Substance and Sexual Activity    Alcohol use: Not on file     Comment: OCC    Drug use: Not on file    Sexual activity: Not on file   Other Topics Concern    Not on file   Social History Narrative    Not on file     Social Determinants of Health     Financial Resource Strain:     Difficulty of Paying Living Expenses: Not on file   Food Insecurity:     Worried About [address] in the Last Year: Not on file    Antonia of Food in the Last Year: Not on file   Transportation Needs:     Lack of Transportation (Medical): Not on file    Lack of Transportation (Non-Medical):  Not on file   Physical Activity:     Days of Exercise per Week: Not on file    Minutes of Exercise per Session: Not on file   Stress:     Feeling of Stress : Not on file   Social Connections:     Frequency of Communication with Friends and Family: Not on file    Frequency of Social Gatherings with Friends and Family: Not on file    Attends Oriental orthodox Services: Not on file    Active Member of Clubs or Organizations: Not on file    Attends Club or Organization Meetings: Not on file    Marital Status: Not on file   Intimate Partner Violence:     Fear of Current or Ex-Partner: Not on file    Emotionally Abused: Not on file    Physically Abused: Not on file   Ivet Rodriguez Sexually Abused: Not on file   Housing Stability:     Unable to Pay for Housing in the Last Year: Not on file    Number of Places Lived in the Last Year: Not on file    Unstable Housing in the Last Year: Not on file       Family History:  No family history on file. PHYSICAL EXAM:  Vital Signs: BP (!) 154/85   Pulse 91   Temp 97.8 °F (36.6 °C) (Oral)   Resp 16   Ht 5' 10\" (1.778 m)   Wt 160 lb (72.6 kg)   SpO2 92%   BMI 22.96 kg/m²   GENERAL APPEARANCE:  awake, alert, oriented, cooperative, and in no acute distress  EYES:  Lids and lashes normal, sclera clear  HENT:  Normocephalic, without obvious abnormality  LUNGS:  Clear to auscultation bilaterally. No increased work of breathing, good air exchange. CARDIOVASCULAR: Regular rate and rhythm  ABDOMEN:  Soft, nontender, grossly distended with fluid wave  MUSCULOSKELETAL:  There is no redness, warmth, or swelling of the joints. EXTREMITIES: No edema  NEUROLOGIC:  Awake, alert, oriented to name, place and time. SKIN: Normal skin color, no rashes  PSYCH: Affect, behavior and insight are all within normal limits.       DATA:  Results for orders placed or performed during the hospital encounter of 02/06/22   COVID-19, Rapid    Specimen: Nasopharyngeal Swab   Result Value Ref Range    SARS-CoV-2, NAAT Not Detected Not Detected   CBC Auto Differential   Result Value Ref Range    WBC 7.1 4.5 - 11.5 E9/L    RBC 3.86 3.80 - 5.80 E12/L    Hemoglobin 13.4 12.5 - 16.5 g/dL    Hematocrit 39.4 37.0 - 54.0 %    .1 (H) 80.0 - 99.9 fL    MCH 34.7 26.0 - 35.0 pg    MCHC 34.0 32.0 - 34.5 %    RDW 13.6 11.5 - 15.0 fL    Platelets 590 463 - 794 E9/L    MPV 10.7 7.0 - 12.0 fL    Neutrophils % 68.0 43.0 - 80.0 %    Lymphocytes % 20.0 20.0 - 42.0 %    Monocytes % 8.0 2.0 - 12.0 %    Eosinophils % 3.0 0.0 - 6.0 %    Basophils % 1.0 0.0 - 2.0 %    Neutrophils Absolute 4.83 1.80 - 7.30 E9/L    Lymphocytes Absolute 1.42 (L) 1.50 - 4.00 E9/L    Monocytes Absolute 0.57 0.10 - 0.95 E9/L    Eosinophils Absolute 0.21 0.05 - 0.50 E9/L    Basophils Absolute 0.07 0.00 - 0.20 E9/L   Comprehensive Metabolic Panel   Result Value Ref Range    Sodium 131 (L) 132 - 146 mmol/L    Potassium 4.6 3.5 - 5.0 mmol/L    Chloride 98 98 - 107 mmol/L    CO2 22 22 - 29 mmol/L    Anion Gap 11 7 - 16 mmol/L    Glucose 105 (H) 74 - 99 mg/dL    BUN 7 6 - 20 mg/dL    CREATININE 0.7 0.7 - 1.2 mg/dL    GFR Non-African American >60 >=60 mL/min/1.73    GFR African American >60     Calcium 8.6 8.6 - 10.2 mg/dL    Total Protein 8.5 (H) 6.4 - 8.3 g/dL    Albumin 3.0 (L) 3.5 - 5.2 g/dL    Total Bilirubin 2.8 (H) 0.0 - 1.2 mg/dL    Alkaline Phosphatase 126 40 - 129 U/L    ALT 17 0 - 40 U/L    AST 67 (H) 0 - 39 U/L   Lipase   Result Value Ref Range    Lipase 23 13 - 60 U/L   Lactic Acid, Plasma   Result Value Ref Range    Lactic Acid 2.6 (H) 0.5 - 2.2 mmol/L   SPECIMEN REJECTION   Result Value Ref Range    Rejected Test trp5     Reason for Rejection see below    Troponin   Result Value Ref Range    Troponin, High Sensitivity 7 0 - 11 ng/L   Ammonia   Result Value Ref Range    Ammonia 24.0 16.0 - 60.0 umol/L   Protime-INR   Result Value Ref Range    Protime 17.9 (H) 9.3 - 12.4 sec    INR 1.5    Vitamin D 25 hydroxy   Result Value Ref Range    Vit D, 25-Hydroxy 38 30 - 100 ng/mL   Hepatitis B surface antibody   Result Value Ref Range    Hep B S Ab Non-Reactive Non-Reactive   Iron and TIBC   Result Value Ref Range    Iron 62 59 - 158 mcg/dL    TIBC 108 (L) 250 - 450 mcg/dL    Iron Saturation 57 (H) 20 - 55 %   Ferritin   Result Value Ref Range    Ferritin 1,444 ng/mL   Hepatitis Panel, Acute   Result Value Ref Range    Hep A IgM Non-Reactive Non-Reactive    Hep B Core Ab, IgM Non-Reactive Non-Reactive    Hep B S Ag Interp Non-Reactive Non-Reactive    Hep C Ab Interp Non-Reactive Non-Reactive   TSH without Reflex   Result Value Ref Range    TSH 5.350 (H) 0.270 - 4.200 uIU/mL   T4, free   Result Value Ref Range    T4 Free 1.09 0.93 - 1.70 ng/dL   CBC   Result Value Ref Range    WBC 6.4 4.5 - 11.5 E9/L    RBC 3.40 (L) 3.80 - 5.80 E12/L    Hemoglobin 11.8 (L) 12.5 - 16.5 g/dL    Hematocrit 34.5 (L) 37.0 - 54.0 %    .5 (H) 80.0 - 99.9 fL    MCH 34.7 26.0 - 35.0 pg    MCHC 34.2 32.0 - 34.5 %    RDW 13.5 11.5 - 15.0 fL    Platelets 915 (L) 720 - 450 E9/L    MPV 10.8 7.0 - 12.0 fL   Phosphorus   Result Value Ref Range    Phosphorus 2.9 2.5 - 4.5 mg/dL   Comprehensive Metabolic Panel w/ Reflex to MG   Result Value Ref Range    Sodium 134 132 - 146 mmol/L    Potassium reflex Magnesium 3.4 (L) 3.5 - 5.0 mmol/L    Chloride 103 98 - 107 mmol/L    CO2 23 22 - 29 mmol/L    Anion Gap 8 7 - 16 mmol/L    Glucose 78 74 - 99 mg/dL    BUN 6 6 - 20 mg/dL    CREATININE 0.7 0.7 - 1.2 mg/dL    GFR Non-African American >60 >=60 mL/min/1.73    GFR African American >60     Calcium 7.9 (L) 8.6 - 10.2 mg/dL    Total Protein 6.8 6.4 - 8.3 g/dL    Albumin 2.1 (L) 3.5 - 5.2 g/dL    Total Bilirubin 1.6 (H) 0.0 - 1.2 mg/dL    Alkaline Phosphatase 116 40 - 129 U/L    ALT 12 0 - 40 U/L    AST 36 0 - 39 U/L   Protime-INR   Result Value Ref Range    Protime 19.2 (H) 9.3 - 12.4 sec    INR 1.6    Magnesium   Result Value Ref Range    Magnesium 1.7 1.6 - 2.6 mg/dL   Lactate dehydrogenase   Result Value Ref Range     135 - 225 U/L   EKG 12 Lead   Result Value Ref Range    Ventricular Rate 116 BPM    Atrial Rate 116 BPM    P-R Interval 166 ms    QRS Duration 94 ms    Q-T Interval 342 ms    QTc Calculation (Bazett) 475 ms    P Axis 59 degrees    R Axis 13 degrees    T Axis 49 degrees         IMAGING:  CT ABDOMEN PELVIS W IV CONTRAST Additional Contrast? None    Result Date: 2/6/2022  EXAMINATION: CT OF THE ABDOMEN AND PELVIS WITH CONTRAST 2/6/2022 12:32 pm TECHNIQUE: CT of the abdomen and pelvis was performed with the administration of intravenous contrast. Multiplanar reformatted images are provided for review.  Dose modulation, iterative reconstruction, and/or weight based adjustment of the mA/kV was utilized to reduce the radiation dose to as low as reasonably achievable. COMPARISON: None. HISTORY: ORDERING SYSTEM PROVIDED HISTORY: RLQ pain radiating into chest TECHNOLOGIST PROVIDED HISTORY: Reason for exam:->RLQ pain radiating into chest Additional Contrast?->None Decision Support Exception - unselect if not a suspected or confirmed emergency medical condition->Emergency Medical Condition (MA) FINDINGS: There are findings for advanced liver parenchymal disease as seen with advanced cirrhosis with atrophy of the left lobe, lobularity of the outlines of the liver, hypertrophy of the caudate lobe with signs of portal collateral circulation throughout to the omentum. The main portal vein is patent the but it is not dilated. The main portal vein measures about 9 mm. Gallbladder is normally distended, it appears unremarkable, being partially surrounded by ascites. The biliary tree and pancreatic ductal systems are not dilated. There is unremarkable appearance for the pancreas with normal pattern of enhancement. The spleen has normal size. Noted is a collapse appearance for the entire colon but with indication for thickening of the bowel wall with some degree of edema from the cecum to the sigmoid colon, the findings are compatible with pancolitis. Uncomplicated diverticulosis seen in the sigmoid colon. Few uncomplicated diverticula is seen in the right-sided colon. There is no specific involvement of the area of the rectum. The appendix is identified with some thickening of the wall which appears to be part of the same process affecting the ascending colon. The small bowel segments are group the towards the center of the abdomen to the large volume ascites. Adrenals not enlarged. Kidneys are preserved size and cortical thickness. The there is normal enhancement for the kidneys. There is no obstruction of the kidneys. There is no renal calculus.  Calcified atheromatous changes are seen throughout the abdominal aorta but there is no aneurysm formation, there is more a pattern of aortic occlusive process in the distal abdominal aorta with areas of intramural plaque formation or chronic intramural dissection distally. Areas of stenosis are seen in the origin of both common iliac arteries with have calcifications in that area. Atherosclerotic changes are seen in the, iliac arteries and superficial femoral arteries bilaterally. The bladder has unremarkable appearance. Prostate gland and seminal vesicles appear unremarkable. Small left-sided pleural effusions are identified in the lower lung bases. Visualized bones demonstrate mild degenerative changes in the thoracolumbar spine. 1.  Findings for advanced liver cirrhosis with large volume of ascites and portal collateral circulation. Decompensated liver cirrhosis considered. 2.  Findings for pancolitis from the cecal area through the sigmoid colon. Uncomplicated diverticula formation seen in the colon, mainly in the sigmoid colon. 3.  Calcified atherosclerotic changes in the abdominal aorta and iliac arterial systems bilaterally without aneurysm formation but with a pattern of developing aortic occlusive process. XR CHEST PORTABLE    Result Date: 2/6/2022  EXAMINATION: ONE XRAY VIEW OF THE CHEST 2/6/2022 12:08 pm COMPARISON: None. HISTORY: ORDERING SYSTEM PROVIDED HISTORY: SOB/CP TECHNOLOGIST PROVIDED HISTORY: Reason for exam:->SOB/CP FINDINGS: There is minimal basilar subsegmental atelectasis on the left. There is a density in the right lower lung possibly associated with nipple shadow or the anterior right 6th rib. No abnormality in the right lower lung on CT abdomen of 02/06/2022. heart is normal in size. No alveolar consolidation identified. Mild left basilar subsegmental atelectasis. No other acute process detected.          ASSESSMENT and PLAN:    # Cirrhosis with acute decompensation  # Ascites  - Most likely etiology 2/2 alcohol use disorder  - Agree with serologic workup to rule out other contributing etiologies  - Ferritin elevated with borderline iron overload; check HFE mutation  - Viral hep panel negative  - F/u ASMA, IGG, TETE, AMA, ceruloplasmin, alpha-1 antitrypsin  - MELD-Na 17  - Currently without hepatic encephalopathy  - Check CMP and INR daily  - Plan for paracentesis with IR today with ascitic fluid analysis to confirm etiology and r/o SBP    Please see orders for further plan of care. Discussed with Dr. Kelsea Rosen DO  GI fellow  2/7/2022 at 11:40 AM    Patient seen and independently examined. Pertinent notes and lab work reviewed. Discussed with Dr. Cee Duarte with physical exam and A&P. Discussed with patient - all questions answered - agreeable with the plan as delineated. Thank you for the opportunity to see this patient in consultation.     Emma Sheriff MD  2/7/2022  12:09 PM

## 2022-02-07 NOTE — PROGRESS NOTES
Patient here for ultrasound guided paracentesis. Instructions given and questions answered. Consent signed. Abdomen scanned and marked under the guidance of procedural Radiologist.      1356  start procedure /79  79  20  96% on room air    1414  end procedure /91  78  18  96% on room air     3900 cc drained of clear yellow colored ascites fluid.       Dry sterile dressing of 2x2 foam tape to RLQ     Specimen sent to lab, RN notified to send labels to lab    Nurse to nurse given to Luis Carlos Dinh    Pt sent back to the floor

## 2022-02-07 NOTE — PROGRESS NOTES
[address] [place], [place] [address]   Progress Note    Admitting Date and Time: 2/6/2022 11:27 AM  Admit Dx: Alcohol withdrawal delirium ([facility] [B71.704]  Alcoholic cirrhosis of liver with ascites ([facility] [K70.31]  Alcohol withdrawal syndrome, with delirium ([facility] [F10.231]  Diarrhea, unspecified type [R19.7]    Subjective:    Patient was sitting at the side of the bed eating breakfast at the time of the exam.   He is for a paracentesis today. When asked about his alcohol use he said that he couldn't give an exact amount of alcohol that he drinks daily but did say he drinks one tall boy at a time and usually has at least two. ROS: denies fever, chills, n/v, HA unless stated above.  atenolol  25 mg Oral Daily    sodium chloride flush  5-40 mL IntraVENous 2 times per day    thiamine  100 mg Oral Daily    sodium chloride flush  5-40 mL IntraVENous 2 times per day    pantoprazole  40 mg IntraVENous Daily    And    sodium chloride (PF)  10 mL IntraVENous Daily    furosemide  40 mg Oral Daily    spironolactone  100 mg Oral Daily     sodium chloride flush, 5-40 mL, PRN  sodium chloride, 25 mL, PRN  LORazepam, 1 mg, Q1H PRN   Or  LORazepam, 1 mg, Q1H PRN   Or  LORazepam, 2 mg, Q1H PRN   Or  LORazepam, 2 mg, Q1H PRN   Or  LORazepam, 3 mg, Q1H PRN   Or  LORazepam, 3 mg, Q1H PRN   Or  LORazepam, 4 mg, Q1H PRN   Or  LORazepam, 4 mg, Q1H PRN  sodium chloride, 25 mL, PRN  ondansetron, 4 mg, Q8H PRN   Or  ondansetron, 4 mg, Q6H PRN  acetaminophen, 500 mg, Q8H PRN         Objective:    BP (!) 154/85   Pulse 91   Temp 97.8 °F (36.6 °C) (Oral)   Resp 16   Ht 5' 10\" (1.778 m)   Wt 160 lb (72.6 kg)   SpO2 92%   BMI 22.96 kg/m²   General Appearance: alert and oriented to person, place and time and in no acute distress  Skin: warm and dry.  Facial flushing  Head: normocephalic and atraumatic  Eyes: pupils equal, round, and reactive to light, conjunctivae normal  Neck: neck supple and non tender without mass Pulmonary/Chest: diminished bilaterally, no respiratory distress  Cardiovascular: normal rate, normal S1 and S2   Abdomen: distended, firm, normal bowel sounds  Extremities: no cyanosis, no clubbing and mild lower extremity edema  Neurologic: no tremor and speech normal      Recent Labs     02/06/22  1142 02/07/22  0503   * 134   K 4.6 3.4*   CL 98 103   CO2 22 23   BUN 7 6   CREATININE 0.7 0.7   GLUCOSE 105* 78   CALCIUM 8.6 7.9*       Recent Labs     02/06/22  1142 02/07/22  0503   ALKPHOS 126 116   PROT 8.5* 6.8   LABALBU 3.0* 2.1*   BILITOT 2.8* 1.6*   AST 67* 36   ALT 17 12       Recent Labs     02/06/22  1142 02/07/22  0503   WBC 7.1 6.4   RBC 3.86 3.40*   HGB 13.4 11.8*   HCT 39.4 34.5*   .1* 101.5*   MCH 34.7 34.7   MCHC 34.0 34.2   RDW 13.6 13.5    129*   MPV 10.7 10.8         Radiology:   CT ABDOMEN PELVIS W IV CONTRAST Additional Contrast? None   Final Result   1. Findings for advanced liver cirrhosis with large volume of ascites and   portal collateral circulation. Decompensated liver cirrhosis considered. 2.  Findings for pancolitis from the cecal area through the sigmoid colon. Uncomplicated diverticula formation seen in the colon, mainly in the sigmoid   colon. 3.  Calcified atherosclerotic changes in the abdominal aorta and iliac   arterial systems bilaterally without aneurysm formation but with a pattern of   developing aortic occlusive process. XR CHEST PORTABLE   Final Result   Mild left basilar subsegmental atelectasis. No other acute process detected. IR US GUIDED PARACENTESIS    (Results Pending)       Assessment:  Active Problems:    Alcohol withdrawal delirium (HCC)    Alcoholic cirrhosis of liver with ascites (HCC)    ETOH abuse    Primary hypertension  Resolved Problems:    * No resolved hospital problems. *      Plan:  1. Cirrhosis of liver with ascites:  Secondary to alcohol abuse. GI following with workup in process.   Ammonia level on admission was 22. Level to be repeated in am.   Continue Lasix and Aldactone. For Paracentesis today. 2. Alcohol abuse with withdrawal:  Continue CIWA scale. Alcohol cessation education. 3. Pancolitis:  Seen on CT of the abdomen. Patient was having diarrhea prior to admission. GI following. 4. Hypertension:  Continue atenolol. 5. Calcified atherosclerotic changes in abdominal aorta and iliac arteries:  Seen on CT of the abdomen with developing aortic occlusive process but without aneurysm. Will need outpatient Vascular follow up. 6. Subclinical hypothyroidism: TSH is 5.350 and free T4 is 1.09. Repeat levels as an outpatient. NOTE: This report was transcribed using voice recognition software. Every effort was made to ensure accuracy; however, inadvertent computerized transcription errors may be present.      Electronically signed by POPPY Perera CNP on 2/7/2022 at 11:56 AM

## 2022-02-08 LAB
AFP-TUMOR MARKER: 5 NG/ML (ref 0–9)
ALBUMIN SERPL-MCNC: 3.1 G/DL (ref 3.5–5.2)
ALP BLD-CCNC: 81 U/L (ref 40–129)
ALT SERPL-CCNC: 10 U/L (ref 0–40)
AMMONIA: 48 UMOL/L (ref 16–60)
AMYLASE FLUID: 14 U/L
ANION GAP SERPL CALCULATED.3IONS-SCNC: 9 MMOL/L (ref 7–16)
ANTI-MITOCHONDRIAL AB, IFA: NEGATIVE
AST SERPL-CCNC: 30 U/L (ref 0–39)
BILIRUB SERPL-MCNC: 2 MG/DL (ref 0–1.2)
BILIRUBIN DIRECT: 0.7 MG/DL (ref 0–0.3)
BILIRUBIN, INDIRECT: 1.3 MG/DL (ref 0–1)
BUN BLDV-MCNC: 6 MG/DL (ref 6–20)
CALCIUM SERPL-MCNC: 8.3 MG/DL (ref 8.6–10.2)
CHLORIDE BLD-SCNC: 100 MMOL/L (ref 98–107)
CO2: 25 MMOL/L (ref 22–29)
CREAT SERPL-MCNC: 0.8 MG/DL (ref 0.7–1.2)
FLUID TYPE: NORMAL
GFR AFRICAN AMERICAN: >60
GFR NON-AFRICAN AMERICAN: >60 ML/MIN/1.73
GLUCOSE BLD-MCNC: 76 MG/DL (ref 74–99)
GRAM STAIN ORDERABLE: NORMAL
HCT VFR BLD CALC: 33 % (ref 37–54)
HEMOGLOBIN: 11.3 G/DL (ref 12.5–16.5)
HEPATITIS BE ANTIGEN: NEGATIVE
INR BLD: 1.7
MAGNESIUM: 1.7 MG/DL (ref 1.6–2.6)
MCH RBC QN AUTO: 34.5 PG (ref 26–35)
MCHC RBC AUTO-ENTMCNC: 34.2 % (ref 32–34.5)
MCV RBC AUTO: 100.6 FL (ref 80–99.9)
PDW BLD-RTO: 13.2 FL (ref 11.5–15)
PLATELET # BLD: 132 E9/L (ref 130–450)
PMV BLD AUTO: 10.8 FL (ref 7–12)
POTASSIUM REFLEX MAGNESIUM: 3.5 MMOL/L (ref 3.5–5)
PROTHROMBIN TIME: 20.3 SEC (ref 9.3–12.4)
RBC # BLD: 3.28 E12/L (ref 3.8–5.8)
SMOOTH MUSCLE ANTIBODY: NEGATIVE
SODIUM BLD-SCNC: 134 MMOL/L (ref 132–146)
TOTAL PROTEIN: 6.6 G/DL (ref 6.4–8.3)
WBC # BLD: 6.2 E9/L (ref 4.5–11.5)

## 2022-02-08 PROCEDURE — 85610 PROTHROMBIN TIME: CPT

## 2022-02-08 PROCEDURE — 2580000003 HC RX 258: Performed by: PHYSICIAN ASSISTANT

## 2022-02-08 PROCEDURE — 80053 COMPREHEN METABOLIC PANEL: CPT

## 2022-02-08 PROCEDURE — 6370000000 HC RX 637 (ALT 250 FOR IP): Performed by: PHYSICIAN ASSISTANT

## 2022-02-08 PROCEDURE — 6360000002 HC RX W HCPCS: Performed by: PHYSICIAN ASSISTANT

## 2022-02-08 PROCEDURE — 6370000000 HC RX 637 (ALT 250 FOR IP): Performed by: EMERGENCY MEDICINE

## 2022-02-08 PROCEDURE — 1200000000 HC SEMI PRIVATE

## 2022-02-08 PROCEDURE — 80076 HEPATIC FUNCTION PANEL: CPT

## 2022-02-08 PROCEDURE — 99232 SBSQ HOSP IP/OBS MODERATE 35: CPT | Performed by: INTERNAL MEDICINE

## 2022-02-08 PROCEDURE — 82140 ASSAY OF AMMONIA: CPT

## 2022-02-08 PROCEDURE — 97110 THERAPEUTIC EXERCISES: CPT

## 2022-02-08 PROCEDURE — 83735 ASSAY OF MAGNESIUM: CPT

## 2022-02-08 PROCEDURE — 85027 COMPLETE CBC AUTOMATED: CPT

## 2022-02-08 PROCEDURE — APPSS45 APP SPLIT SHARED TIME 31-45 MINUTES: Performed by: PHYSICIAN ASSISTANT

## 2022-02-08 PROCEDURE — 97165 OT EVAL LOW COMPLEX 30 MIN: CPT

## 2022-02-08 PROCEDURE — 97530 THERAPEUTIC ACTIVITIES: CPT

## 2022-02-08 PROCEDURE — 6370000000 HC RX 637 (ALT 250 FOR IP): Performed by: INTERNAL MEDICINE

## 2022-02-08 PROCEDURE — C9113 INJ PANTOPRAZOLE SODIUM, VIA: HCPCS | Performed by: PHYSICIAN ASSISTANT

## 2022-02-08 PROCEDURE — 36415 COLL VENOUS BLD VENIPUNCTURE: CPT

## 2022-02-08 RX ORDER — M-VIT,TX,IRON,MINS/CALC/FOLIC 27MG-0.4MG
1 TABLET ORAL DAILY
Status: DISCONTINUED | OUTPATIENT
Start: 2022-02-08 | End: 2022-02-09 | Stop reason: HOSPADM

## 2022-02-08 RX ADMIN — SPIRONOLACTONE 100 MG: 25 TABLET ORAL at 09:05

## 2022-02-08 RX ADMIN — SODIUM CHLORIDE, PRESERVATIVE FREE 10 ML: 5 INJECTION INTRAVENOUS at 09:06

## 2022-02-08 RX ADMIN — ATENOLOL 25 MG: 25 TABLET ORAL at 09:05

## 2022-02-08 RX ADMIN — FUROSEMIDE 40 MG: 40 TABLET ORAL at 09:05

## 2022-02-08 RX ADMIN — MULTIPLE VITAMINS W/ MINERALS TAB 1 TABLET: TAB at 12:56

## 2022-02-08 RX ADMIN — PANTOPRAZOLE SODIUM 40 MG: 40 INJECTION, POWDER, FOR SOLUTION INTRAVENOUS at 09:05

## 2022-02-08 RX ADMIN — THIAMINE HCL TAB 100 MG 100 MG: 100 TAB at 09:07

## 2022-02-08 RX ADMIN — ACETAMINOPHEN 500 MG: 500 TABLET ORAL at 08:02

## 2022-02-08 ASSESSMENT — PAIN SCALES - GENERAL
PAINLEVEL_OUTOF10: 5
PAINLEVEL_OUTOF10: 2
PAINLEVEL_OUTOF10: 0

## 2022-02-08 NOTE — PROGRESS NOTES
Physical Therapy Treatment Note/Plan of Care    Room #:  1754/7340-48  Patient Name: Beryle Cedars  YOB: 1965  MRN: [id number]    Date of Service: 2/8/2022     Tentative placement recommendation: drug/alcohol rehab   Equipment recommendation: To be determined      Evaluating Physical Therapist: Ruby Whitten #[id number]      Specific Provider Orders/Date/Referring Provider :   02/07/22 1245   PT eval and treat Start: 02/07/22 1245, End: 02/07/22 1245, ONE TIME, Standing Count: 1 Occurrences, R    Mihir Perkins, APRN - CNP      Admitting Diagnosis:   Alcohol withdrawal delirium ([facility] [Z86.408]  Alcoholic cirrhosis of liver with ascites ([facility] [K70.31]  Alcohol withdrawal syndrome, with delirium ([facility] [F10.231]  Diarrhea, unspecified type [R19.7]      Surgery:paracentesis 2/7/22      Visit Diagnoses       Codes    Alcohol withdrawal syndrome, with delirium ([facility]     F10.231    Diarrhea, unspecified type     R19.7          Patient Active Problem List   Diagnosis    Alcohol withdrawal delirium ([facility]    Alcoholic cirrhosis of liver with ascites ([facility]    ETOH abuse    Primary hypertension        ASSESSMENT of Current Deficits Patient exhibits decreased balance and endurance impairing transfers and gait distance. Patient unsteady at  first standing up and ambulating 30 feet holding onto all hand rails in the hallway for support. Once he used the wheeled walker, he was able to perform gait and stair training with no loss of balance or unsteadiness noted. Patient can be impulsive and unsteady at times; which can increase his risk for falls. The patient will benefit from continued skilled therapy to increase strength and improve balance for safe functional mobility, to decrease risk of falls, and to meet goals at discharge.       PHYSICAL THERAPY  PLAN OF CARE       Physical therapy plan of care is established based on physician order,  patient diagnosis and clinical assessment    Current Treatment Recommendations:    -Standing Balance: Perform strengthening exercises in standing to promote motor control with or without upper extremity support   -Transfers: Provide instruction on proper hand and foot position for adequate transfer of weight onto lower extremities and use of gait device if needed and Cues for hand placement, technique and safety. Provide stabilization to prevent fall   -Gait: Gait training and Standing activities to improve: base of support, weight shift, weight bearing    -Endurance: Utilize Supervised activities to increase level of endurance to allow for safe functional mobility including transfers and gait   -Stairs: Stair training with instruction on proper technique and hand placement on rail    PT long term treatment goals are located in below grid    Patient and or family understand(s) diagnosis, prognosis, and plan of care. Frequency of treatments: Patient will be seen  daily.          Prior Level of Function: Patient ambulated independently   Rehab Potential: good  for baseline    Past medical history:   Past Medical History:   Diagnosis Date    Alcoholic cirrhosis ([facility]     ETOH abuse     Hypertension      Past Surgical History:   Procedure Laterality Date    HERNIA REPAIR         SUBJECTIVE:    Precautions: Up as tolerated, falls , alcohol abuse   Social history: Patient lives with brother (who lives there some times) in a two story home bedroom and bathroom 2nd floor 14 steps  with 6 steps  to enter with Rail  Tub shower     Equipment owned: None,      [address] [place] [address],[address] Floor Mobility Inpatient   How much difficulty turning over in bed?: None  How much difficulty sitting down on / standing up from a chair with arms?: None  How much difficulty moving from lying on back to sitting on side of bed?: None  How much help from another person moving to and from a bed to a chair?: A Little  How much help from another person needed to walk in hospital room?: A Little  How much help from another person for climbing 3-5 steps with a railing?: A Little  AM-PAC Inpatient Mobility Raw Score : 21  AM-PAC Inpatient T-Scale Score : 50.25  Mobility Inpatient CMS 0-100% Score: 28.97  Mobility Inpatient CMS G-Code Modifier : [address] cleared patient for PT treatment. .   OBJECTIVE;   Initial Evaluation  Date: 2/7/2022 Treatment Date:  2/8/2022       Short Term/ Long Term   Goals   Was pt agreeable to Eval/treatment? Yes yes To be met in 5 days   Pain level   5/10  Right side pain  0/10    Bed Mobility    Rolling: Independent    Supine to sit: Independent    Sit to supine: Independent    Scooting: Independent   Rolling: Independent   Supine to sit: Independent   Sit to supine: Independent   Scooting: Independent       Transfers Sit to stand: Supervision   Sit to stand: Supervision       Sit to stand: Independent    Ambulation    2x20, 50 feet using  no device with Supervision    for safety 30 feet using no AD; unsteady; 2 x 70 feet using  wheeled walker with Supervision    cues for upright posture, safety and pacing    150 feet using  no device with Independent    Stair negotiation: ascended and descended   Not assessed  20 steps using 1 rail with Supervision. Cues for sequencing, decreased speed, and safety.    10 steps with HR independence    ROM Within functional limits       Strength BUE:  5/5  RLE:  4+/5  LLE:  4+/5  Increase strength in affected mm groups by 1/3 grade   Balance Sitting EOB:  good   Dynamic Standing:  good - Sitting EOB: good   Dynamic Standing: fair minus with no AD; fair + with wheeled walker   Sitting EOB:  good   Dynamic Standing: good      Patient is Alert & Oriented x person, place, time and situation and follows directions    Sensation:  Patient  denies numbness/tingling   Edema:  no   Endurance: fair     Vitals: room air   Blood Pressure at rest  Blood Pressure during session    Heart Rate at rest  Heart Rate during session    SPO2 at rest %  SPO2 during session %     Patient education  Patient educated on role of Physical Therapy, risks of immobility, safety and plan of care,  importance of mobility while in hospital , ankle pumps, quad set and glut set for edema control, blood clot prevention and safety      Patient response to education:   Pt verbalized understanding Pt demonstrated skill Pt requires further education in this area   Yes Partial Yes      Treatment:  Patient practiced and was instructed/facilitated in the following treatment: Patient assisted to EOB. Sat edge of bed 13 minutes with Supervision  to increase dynamic sitting balance and activity tolerance. Pt stood, ambulated in the hallway using no AD, unsteady, holding onto all hand rails in the hallway, Karuna Block got me a wheeled walker, ambulated to the stairs, performed stair training, and ambulated back to his room. Therapeutic Exercises:  not performed       At end of session, patient in bed with  call light and phone within reach,  all lines and tubes intact, nursing notified. Patient would benefit from continued skilled Physical Therapy to improve functional independence and quality of life. Patient's/ family goals   home    Time in  10:01  Time out  10:24    Total Treatment Time  23 minutes        CPT codes:    Therapeutic activities (85000)   23 minutes  2 unit(s)   Johnathon Miguel.  Tessa  [facility]  LIC # [id number]

## 2022-02-08 NOTE — PROGRESS NOTES
[address] [place], [place] [address]ist   Progress Note    Admitting Date and Time: 2/6/2022 11:27 AM  Admit Dx: Alcohol withdrawal delirium ([facility] [P12.515]  Alcoholic cirrhosis of liver with ascites ([facility] [K70.31]  Alcohol withdrawal syndrome, with delirium ([facility] [F10.231]  Diarrhea, unspecified type [R19.7]    Subjective:    Patient was laying in bed bed at the time of the exam. He feels so much better following the paracentesis. Pain is minimal controlled by Tylenol. When asked about his alcohol use he said that he couldn't give an exact amount of alcohol that he drinks daily but did say he drinks one tall boy at a time and usually has at least two. ROS: denies fever, chills, n/v, HA unless stated above.  atenolol  25 mg Oral Daily    sodium chloride flush  5-40 mL IntraVENous 2 times per day    thiamine  100 mg Oral Daily    sodium chloride flush  5-40 mL IntraVENous 2 times per day    pantoprazole  40 mg IntraVENous Daily    And    sodium chloride (PF)  10 mL IntraVENous Daily    furosemide  40 mg Oral Daily    spironolactone  100 mg Oral Daily     sodium chloride flush, 5-40 mL, PRN  sodium chloride, 25 mL, PRN  LORazepam, 1 mg, Q1H PRN   Or  LORazepam, 1 mg, Q1H PRN   Or  LORazepam, 2 mg, Q1H PRN   Or  LORazepam, 2 mg, Q1H PRN   Or  LORazepam, 3 mg, Q1H PRN   Or  LORazepam, 3 mg, Q1H PRN   Or  LORazepam, 4 mg, Q1H PRN   Or  LORazepam, 4 mg, Q1H PRN  sodium chloride, 25 mL, PRN  ondansetron, 4 mg, Q8H PRN   Or  ondansetron, 4 mg, Q6H PRN  acetaminophen, 500 mg, Q8H PRN    Objective:    BP (!) 136/55   Pulse 76   Temp 98.4 °F (36.9 °C) (Oral)   Resp 16   Ht 5' 10\" (1.778 m)   Wt 161 lb 1 oz (73.1 kg)   SpO2 94%   BMI 23.11 kg/m²   General Appearance: alert and oriented to person, place and time and in no acute distress  Skin: warm and dry.  Facial flushing  Head: normocephalic and atraumatic  Eyes: pupils equal, round, and reactive to light, conjunctivae normal  Neck: neck supple and non tender without mass   Pulmonary/Chest: diminished bilaterally, no respiratory distress  Cardiovascular: normal rate, normal S1 and S2   Abdomen: distended, softer, normal bowel sounds, liver palpated 3 fingers below right costal margin  Extremities: no cyanosis, no clubbing and mild lower extremity edema  Neurologic: no tremor and speech normal    Recent Labs     02/06/22  1142 02/07/22  0503 02/08/22  0523   * 134 134   K 4.6 3.4* 3.5   CL 98 103 100   CO2 22 23 25   BUN 7 6 6   CREATININE 0.7 0.7 0.8   GLUCOSE 105* 78 76   CALCIUM 8.6 7.9* 8.3*       Recent Labs     02/06/22  1142 02/07/22  0503 02/08/22  0523   ALKPHOS 126 116 81   PROT 8.5* 6.8 6.6   LABALBU 3.0* 2.1* 3.1*   BILITOT 2.8* 1.6* 2.0*   AST 67* 36 30   ALT 17 12 10       Recent Labs     02/06/22  1142 02/07/22  0503 02/08/22  0523   WBC 7.1 6.4 6.2   RBC 3.86 3.40* 3.28*   HGB 13.4 11.8* 11.3*   HCT 39.4 34.5* 33.0*   .1* 101.5* 100.6*   MCH 34.7 34.7 34.5   MCHC 34.0 34.2 34.2   RDW 13.6 13.5 13.2    129* 132   MPV 10.7 10.8 10.8     Radiology:   IR US GUIDED PARACENTESIS   Final Result   Successful paracentesis. CT ABDOMEN PELVIS W IV CONTRAST Additional Contrast? None   Final Result   1. Findings for advanced liver cirrhosis with large volume of ascites and   portal collateral circulation. Decompensated liver cirrhosis considered. 2.  Findings for pancolitis from the cecal area through the sigmoid colon. Uncomplicated diverticula formation seen in the colon, mainly in the sigmoid   colon. 3.  Calcified atherosclerotic changes in the abdominal aorta and iliac   arterial systems bilaterally without aneurysm formation but with a pattern of   developing aortic occlusive process. XR CHEST PORTABLE   Final Result   Mild left basilar subsegmental atelectasis. No other acute process detected.            Assessment:  Active Problems:    Alcohol withdrawal delirium (HCC)    Alcoholic cirrhosis of liver with ascites ([address]    ETOH abuse    Primary hypertension  Resolved Problems:    * No resolved hospital problems. *    Plan:  1. Cirrhosis of liver with ascites MELD score 17:    -Secondary to alcohol abuse. -GI following with workup in process.   -Ammonia level on admission was 22--->48, clinically without encephalopathy  -Continue Lasix and Aldactone. -S/p Paracentesis  3900cc fluid analysis pending.   -I's and O's +170     2. Alcohol abuse with withdrawal:    -Continue CIWA scale, thiamine, MVI. -Alcohol cessation education. My discussions today revealed that he wants his father  from alcohol abuse and the explained to him that even a few beers or not good with his degree of cirrhosis and he gave little bit understanding that he did when I admitted him. 3. Pancolitis:    -Seen on CT of the abdomen.    -Patient was having diarrhea prior to admission, has resolved. GI following. 4. Hypertension:    -Continue atenolol, Lasix and Aldactone    5. Calcified atherosclerotic changes in abdominal aorta and iliac arteries:    -Seen on CT of the abdomen with developing aortic occlusive process but without aneurysm.    -Will need outpatient Vascular follow up.   -No statins at this time    6. Subclinical hypothyroidism:   -TSH is 5.350 and free T4 is 1.09.    -Repeat levels as an outpatient. CODE STATUS: Full code  VTE prophylaxis: SCDs as patient has elevated INR PT secondary to alcohol use  Disposition: 24 to 48 hours pending clearance by GI    NOTE: This report was transcribed using voice recognition software. Every effort was made to ensure accuracy; however, inadvertent computerized transcription errors may be present.      Electronically signed by Cheyanne Varela PA-C on 2022 at 9:31 AM

## 2022-02-08 NOTE — PROGRESS NOTES
[address] [facility]  [place] Pham. [place]        Date:2022                                                  Patient Name: Amalia Shetty    MRN: [id number]    : 1965    Room: 0329/0329-01      Evaluating OT: Marine Alas OTR/L; [id number]     Referring Provider and Specific Provider Orders/Date:      22 124  OT eval and treat  Start:  22 1245,   End:  22 1245,   ONE TIME,   Standing Count:  1 Occurrences,   R         Dianelys Low, APRN - CNP     Placement Recommendation: Home with no skilled occupational therapy needed after discharge from inpatient. Pt declined [address] Diagnosis:   1. Alcoholic cirrhosis of liver with ascites ([facility] [address]    2. Alcohol withdrawal syndrome, with delirium ([facility] [address]    3.  Diarrhea, unspecified type         Surgery: paracentesis       Pertinent Medical History:       Past Medical History:   Diagnosis Date    Alcoholic cirrhosis ([facility] [address]     ETOH abuse     Hypertension          Past Surgical History:   Procedure Laterality Date    HERNIA REPAIR        Precautions:  Fall Risk, paracentesis, ETOH     Assessment of current deficits    [x] Functional mobility  [x]ADLs  [x] Strength               []Cognition    [x] Functional transfers   [x] IADLs         [] Safety Awareness   [x]Endurance    [] Fine Coordination              [x] Balance      [] Vision/perception   []Sensation     []Gross Motor Coordination  [] ROM  [] Delirium                   [] Motor Control     OT PLAN OF CARE   OT POC based on physician orders, patient diagnosis and results of clinical assessment    Frequency/Duration 1-3 days/wk for 2 weeks PRN     Specific OT Treatment Interventions to include:   * Functional transfer/mobility training/DME recommendations for increased independence, safety, and fall prevention  * Patient/Family education to increase follow through with safety techniques and functional independence  * Recommendation of environmental modifications for increased safety with functional transfers/mobility and ADLs  * Therapeutic exercise to improve motor endurance, ROM, and functional strength for ADLs/functional transfers    Recommended Adaptive Equipment: brother present will purchase tub transfer bench at a discounted rate     Home Living: with family: brother; single family home, 2 story, bedroom and bathroom on 2nd floor up 14 steps with rail, tub shower, 4 steps to enter house with rail       Equipment owned: none     Prior Level of Function: Independent with ADLs , Independent with IADLs; ambulated with no device    Driving: yes  Occupation: not working    Pain Level: 5/10 pain in R lower quadrant; Nursing notified. Cognition: A&O: 3/4; Follows 1 step directions   Memory: fair    Sequencing: fair    Problem solving: fair    Judgement/safety: fair     [facility]   AM-PAC Daily Activity Inpatient   How much help for putting on and taking off regular lower body clothing?: A Little  How much help for Bathing?: A Little  How much help for Toileting?: A Little  How much help for putting on and taking off regular upper body clothing?: A Little  How much help for taking care of personal grooming?: A Little  How much help for eating meals?: None  AM-[facility] Inpatient Daily Activity Raw Score: 19  AM-PAC Inpatient ADL T-Scale Score : 40.22  ADL Inpatient CMS 0-100% Score: 42.8  ADL Inpatient CMS G-Code Modifier : CK     Functional Assessment:    Initial Eval Status  Date: 2/8/22 Treatment Status  Date: STGs = LTGs  Time frame: 10-14 days   Feeding Independent   Independent    Grooming Supervision   Independent    UB Dressing Supervision   Independent    LB Dressing Supervision to don shoes at EOB  Independent    Bathing Supervision  Modified [place]    Toileting Supervision   Independent    Bed Mobility  Supine to sit: Independent   Sit to supine: Independent   Supine to sit:  Independent   Sit to supine: Independent    Functional Transfers Supervision from EOB. Supervision to and from low commode with minimal verbal cues to use grab bar for safe commode tranfer. Supervision for transfer from standing to bedside chair. Transfer training with verbal cues for hand placement throughout session to improve safety. Independent    Functional Mobility Supervision  with no device to and from bathroomto improve balance  Independent    Balance Sitting:     Static: good     Dynamic: good   Standing: fair plus with no device     Activity Tolerance Fair plus  good    Visual/  Perceptual Glasses: readers only                 Hand Dominance: right      AROM (PROM) Strength Additional Info:    RUE  WFL 5/5 good  and wfl FMC/dexterity noted during ADL tasks     LUE WFL 5/5 good  and wfl FMC/dexterity noted during ADL tasks       [facility]   Sensation:  No c/o numbness or tingling  Tone: WFL   Edema: no    Comments: Upon arrival the patient was supine. At end of session, patient was up in chair with call light and phone within reach, all lines and tubes intact. Brother present. Overall patient demonstrated decreased independence and safety during completion of ADL/functional transfer/mobility tasks. Pt would benefit from continued skilled OT to increase safety and independence with completion of ADL/IADL tasks for functional independence and quality of life. Treatment: OT treatment provided this date includes:    Instruction/training on safety and adapted techniques for completion of ADLs    Instruction/training in DME available    Instruction/training on safe functional mobility/transfer techniques    Instruction/training on energy conservation/work simplification for completion of ADLs    Proper Positioning/Alignment   BUE AROM exercises: 10 X in all planes of movement to increase ROM required for functional transfers/ADL participation.  Exercises completed in shoulder and elbow flexion/extension, internal/external rotation, abduction/adduction, supination/pronation, digit and wrist flexion/extension, abduction/adduction and digit opposition. B LE AROM exercises: 10X in ankle pumps, LAQ, glut sets to increase/maintain strength required for functional transfers/ADL participation. Rehab Potential: Good for established goals. Patient / Family Goal: return home      Patient and/or family were instructed on functional diagnosis, prognosis/goals and OT plan of care. Demonstrated good understanding. Eval Complexity: Low    Time In: 10:34am   Time Out: 11:02am    Total Treatment Time:       Min Units   OT Eval Low 97165  X  1    OT Eval Medium 30467      OT Eval High 68726      OT Re-Eval [id number]            ADL/Self Care 65530     Therapeutic Activities 49205       Therapeutic Ex 91427  8   1    Orthotic Management 01969       Manual [id number]     Neuro Re-Ed 61107       Non-Billable Time        Evaluation Time additionally includes thorough review of current medical information, gathering information on past medical history/social history and prior level of function, interpretation of standardized testing/informal observation of tasks, assessment of data and development of plan of care and goals.         Evaluating OT: Mauricio Monroe OTR/L; [id number]

## 2022-02-08 NOTE — PROGRESS NOTES
Nutrition Assessment     Type and Reason for Visit: Initial,Consult (General nutrition Management)    Nutrition Recommendations/Plan: Continue to monitor    Nutrition Assessment:  Pt. admits w/ cirrhosis of liver w/ ascites 2/2  ETOH abuse. Pt w/episodes of diarrhea PTA, now resolved. PO intake are now improved ~75%. Will continue to monitor      Estimated Daily Nutrient Needs:  Energy (kcal): ; Weight Used for Energy Requirements:  Current     Protein (g): 75-90 (X1.0-1.2); Weight Used for Protein Requirements:  Admission (73kg = 73-88)        Fluid (ml/day): ; Weight Used for Fluid Requirements:  1 ml/kcal      Nutrition Related Findings: A/O, abd soft, tender, BS active, no edema. Current Nutrition Therapies:    ADULT DIET; Regular; 5 carb choices (75 gm/meal); Low Sodium (2 gm);  Post Gastrectomy; 2000 ml    Anthropometric Measures:  · Height: 5' 10\" (177.8 cm)  · Current Body Wt: 161 lb (73 kg) (2/8 bed scale)   · BMI: 23.1    Nutrition Diagnosis:   No nutrition diagnosis at this time     Nutrition Interventions:   Food and/or Nutrient Delivery:  Continue Current Diet  Nutrition Education/Counseling:  No recommendation at this time   Coordination of Nutrition Care:  Continue to monitor while inpatient    Goals:  Consume >75% meals       Nutrition Monitoring and Evaluation:   Behavioral-Environmental Outcomes:  None Identified   Food/Nutrient Intake Outcomes:  Food and Nutrient Intake  Physical Signs/Symptoms Outcomes:  Biochemical Data,Nutrition Focused Physical Findings,Skin,Weight,GI Status,Fluid Status or Edema     Discharge Planning:    No discharge needs at this time     Electronically signed by Kolby Root, RD, LD on 2/8/22 at 12:16 PM EST    Contact: [phone]

## 2022-02-08 NOTE — PROGRESS NOTES
PROGRESS NOTE  [facility]  Dr. Pinky Bell MD, Dr. Pippa Wilks MD, Dr Cherelle Lopez, Dr. Claudean Hoff, MD, Dr. Kate Jimenez, DO Cayla Stone  49 y.o.  male    SUBJECTIVE: No acute events overnight, patient reports pain is improved today. Has not had a bowel movement in 4 days.     OBJECTIVE:    BP (!) 136/55   Pulse 76   Temp 98.4 °F (36.9 °C) (Oral)   Resp 16   Ht 5' 10\" (1.778 m)   Wt 161 lb 1 oz (73.1 kg)   SpO2 94%   BMI 23.11 kg/m²     Gen: NAD, AAO x 3  HEENT:PEERL, no icterus  Heart: RRR, no M/R/G  Lungs: CTAB  Abd.: soft, NT, ND, BS +, no G/R, no HSM  Extr.: no C/C/E, no bruising         Lab Results   Component Value Date    WBC 6.2 02/08/2022    WBC 6.4 02/07/2022    WBC 7.1 02/06/2022    HGB 11.3 02/08/2022    HGB 11.8 02/07/2022    HGB 13.4 02/06/2022    HCT 33.0 02/08/2022    .6 02/08/2022    RDW 13.2 02/08/2022     02/08/2022     02/07/2022     02/06/2022     Lab Results   Component Value Date     02/08/2022    K 3.5 02/08/2022     02/08/2022    CO2 25 02/08/2022    BUN 6 02/08/2022    CREATININE 0.8 02/08/2022    CALCIUM 8.3 02/08/2022    PROT 6.6 02/08/2022    LABALBU 3.1 02/08/2022    BILITOT 2.0 02/08/2022    BILITOT 1.6 02/07/2022    BILITOT 2.8 02/06/2022    ALKPHOS 81 02/08/2022    ALKPHOS 116 02/07/2022    ALKPHOS 126 02/06/2022    AST 30 02/08/2022    AST 36 02/07/2022    AST 67 02/06/2022    ALT 10 02/08/2022    ALT 12 02/07/2022    ALT 17 02/06/2022     Lab Results   Component Value Date    LIPASE 23 02/06/2022     No results found for: AMYLASE      ASSESSMENT/PLAN:  # Cirrhosis with acute decompensation  # Ascites  - Most likely etiology 2/2 alcohol use disorder  - Ferritin elevated with borderline iron overload; check HFE mutation  - Viral hep panel negative  - F/u ASMA, IGG, TETE, AMA, ceruloplasmin, alpha-1 antitrypsin  - MELD-Na 17 on admission  - Currently without hepatic encephalopathy  - Check CMP and INR daily while inpatient  - Underwent paracentesis 2/7/2022, approximately 3 L removed  - SAAG 1.4, consistent with portal hypertension, no SBP    OK to be d/c from GI POV. We will sign off. Follow in office in 2-4 weeks or as needed - pt to call for appointment - [phone].     Discussed with Dr. Jose Muse DO  GI Fellow   2/8/2022  1:20 PM

## 2022-02-08 NOTE — PLAN OF CARE
Problem: Falls - Risk of:  Goal: Will remain free from falls  Description: Will remain free from falls  2/8/2022 0940 by Darcie Layne RN  Outcome: Met This Shift     Problem: Falls - Risk of:  Goal: Absence of physical injury  Description: Absence of physical injury  2/8/2022 0940 by Darcie Layne RN  Outcome: Met This Shift     Problem: Fluid Volume - Deficit:  Goal: Absence of fluid volume deficit signs and symptoms  Description: Absence of fluid volume deficit signs and symptoms  Outcome: Met This Shift     Problem: Nutrition Deficit:  Goal: Ability to achieve adequate nutritional intake will improve  Description: Ability to achieve adequate nutritional intake will improve  Outcome: Met This Shift     Problem: Sleep Pattern Disturbance:  Goal: Appears well-rested  Description: Appears well-rested  Outcome: Met This Shift     Problem: Pain:  Goal: Pain level will decrease  Description: Pain level will decrease  Outcome: Met This Shift     Problem: Pain:  Goal: Control of acute pain  Description: Control of acute pain  Outcome: Met This Shift

## 2022-02-09 ENCOUNTER — APPOINTMENT (OUTPATIENT)
Dept: INTERVENTIONAL RADIOLOGY/VASCULAR | Age: 57
DRG: 280 | End: 2022-02-09
Payer: MEDICAID

## 2022-02-09 VITALS
SYSTOLIC BLOOD PRESSURE: 124 MMHG | DIASTOLIC BLOOD PRESSURE: 56 MMHG | HEART RATE: 68 BPM | RESPIRATION RATE: 16 BRPM | OXYGEN SATURATION: 94 % | WEIGHT: 161.06 LBS | TEMPERATURE: 98.7 F | HEIGHT: 70 IN | BODY MASS INDEX: 23.06 KG/M2

## 2022-02-09 LAB
ALBUMIN FLUID: <0.2 G/DL
ALBUMIN SERPL-MCNC: 3 G/DL (ref 3.5–5.2)
ALP BLD-CCNC: 115 U/L (ref 40–129)
ALPHA-1 ANTITRYPSIN: 193 MG/DL (ref 90–200)
ALT SERPL-CCNC: 11 U/L (ref 0–40)
AMYLASE FLUID: 193 U/L
ANION GAP SERPL CALCULATED.3IONS-SCNC: 10 MMOL/L (ref 7–16)
APPEARANCE FLUID: CLEAR
AST SERPL-CCNC: 33 U/L (ref 0–39)
BILIRUB SERPL-MCNC: 1.2 MG/DL (ref 0–1.2)
BUN BLDV-MCNC: 7 MG/DL (ref 6–20)
CALCIUM SERPL-MCNC: 8.3 MG/DL (ref 8.6–10.2)
CELL COUNT FLUID TYPE: NORMAL
CERULOPLASMIN: 21 MG/DL (ref 15–30)
CHLORIDE BLD-SCNC: 104 MMOL/L (ref 98–107)
CO2: 24 MMOL/L (ref 22–29)
COLOR FLUID: YELLOW
CREAT SERPL-MCNC: 0.8 MG/DL (ref 0.7–1.2)
GFR AFRICAN AMERICAN: >60
GFR NON-AFRICAN AMERICAN: >60 ML/MIN/1.73
GLUCOSE BLD-MCNC: 90 MG/DL (ref 74–99)
HCT VFR BLD CALC: 33.6 % (ref 37–54)
HEMOGLOBIN: 11.8 G/DL (ref 12.5–16.5)
HEPATITIS BE ANTIBODY: NEGATIVE
INR BLD: 1.6
MAGNESIUM: 1.6 MG/DL (ref 1.6–2.6)
MCH RBC QN AUTO: 34.7 PG (ref 26–35)
MCHC RBC AUTO-ENTMCNC: 35.1 % (ref 32–34.5)
MCV RBC AUTO: 98.8 FL (ref 80–99.9)
MONOCYTE, FLUID: 88 %
NEUTROPHIL, FLUID: 12 %
NUCLEATED CELLS FLUID: <3 /UL
PDW BLD-RTO: 13.1 FL (ref 11.5–15)
PLATELET # BLD: 142 E9/L (ref 130–450)
PMV BLD AUTO: 10.8 FL (ref 7–12)
POTASSIUM REFLEX MAGNESIUM: 3.2 MMOL/L (ref 3.5–5)
PROTEIN FLUID: <0.2 G/DL
PROTHROMBIN TIME: 19 SEC (ref 9.3–12.4)
RBC # BLD: 3.4 E12/L (ref 3.8–5.8)
RBC FLUID: <2000 /UL
SODIUM BLD-SCNC: 138 MMOL/L (ref 132–146)
TOTAL PROTEIN: 6.8 G/DL (ref 6.4–8.3)
WBC # BLD: 6.9 E9/L (ref 4.5–11.5)

## 2022-02-09 PROCEDURE — 84157 ASSAY OF PROTEIN OTHER: CPT

## 2022-02-09 PROCEDURE — 88112 CYTOPATH CELL ENHANCE TECH: CPT

## 2022-02-09 PROCEDURE — P9047 ALBUMIN (HUMAN), 25%, 50ML: HCPCS | Performed by: PHYSICIAN ASSISTANT

## 2022-02-09 PROCEDURE — 83735 ASSAY OF MAGNESIUM: CPT

## 2022-02-09 PROCEDURE — 6370000000 HC RX 637 (ALT 250 FOR IP): Performed by: PHYSICIAN ASSISTANT

## 2022-02-09 PROCEDURE — 99239 HOSP IP/OBS DSCHRG MGMT >30: CPT | Performed by: INTERNAL MEDICINE

## 2022-02-09 PROCEDURE — 6360000002 HC RX W HCPCS: Performed by: PHYSICIAN ASSISTANT

## 2022-02-09 PROCEDURE — 2580000003 HC RX 258: Performed by: PHYSICIAN ASSISTANT

## 2022-02-09 PROCEDURE — 36415 COLL VENOUS BLD VENIPUNCTURE: CPT

## 2022-02-09 PROCEDURE — 85027 COMPLETE CBC AUTOMATED: CPT

## 2022-02-09 PROCEDURE — 82150 ASSAY OF AMYLASE: CPT

## 2022-02-09 PROCEDURE — 6370000000 HC RX 637 (ALT 250 FOR IP): Performed by: EMERGENCY MEDICINE

## 2022-02-09 PROCEDURE — 85610 PROTHROMBIN TIME: CPT

## 2022-02-09 PROCEDURE — 49083 ABD PARACENTESIS W/IMAGING: CPT

## 2022-02-09 PROCEDURE — 6370000000 HC RX 637 (ALT 250 FOR IP): Performed by: INTERNAL MEDICINE

## 2022-02-09 PROCEDURE — 2580000003 HC RX 258: Performed by: EMERGENCY MEDICINE

## 2022-02-09 PROCEDURE — APPSS60 APP SPLIT SHARED TIME 46-60 MINUTES: Performed by: PHYSICIAN ASSISTANT

## 2022-02-09 PROCEDURE — 80053 COMPREHEN METABOLIC PANEL: CPT

## 2022-02-09 PROCEDURE — C1729 CATH, DRAINAGE: HCPCS

## 2022-02-09 PROCEDURE — 82042 OTHER SOURCE ALBUMIN QUAN EA: CPT

## 2022-02-09 RX ORDER — POTASSIUM CHLORIDE 20 MEQ/1
40 TABLET, EXTENDED RELEASE ORAL DAILY
Qty: 180 TABLET | Refills: 1 | Status: SHIPPED | OUTPATIENT
Start: 2022-02-09

## 2022-02-09 RX ORDER — ATENOLOL 25 MG/1
25 TABLET ORAL DAILY
Qty: 30 TABLET | Refills: 3 | Status: SHIPPED | OUTPATIENT
Start: 2022-02-09 | End: 2022-02-23 | Stop reason: SDUPTHER

## 2022-02-09 RX ORDER — MAGNESIUM OXIDE 420 MG/1
420 TABLET ORAL DAILY
Qty: 30 TABLET | Refills: 0 | Status: SHIPPED | OUTPATIENT
Start: 2022-02-09 | End: 2022-03-23 | Stop reason: SDUPTHER

## 2022-02-09 RX ORDER — ALBUMIN (HUMAN) 12.5 G/50ML
50 SOLUTION INTRAVENOUS ONCE
Status: COMPLETED | OUTPATIENT
Start: 2022-02-09 | End: 2022-02-09

## 2022-02-09 RX ORDER — THIAMINE MONONITRATE (VIT B1) 100 MG
100 TABLET ORAL DAILY
Refills: 0 | COMMUNITY
Start: 2022-02-09 | End: 2022-03-23 | Stop reason: SDUPTHER

## 2022-02-09 RX ORDER — FUROSEMIDE 40 MG/1
40 TABLET ORAL DAILY
Qty: 60 TABLET | Refills: 3 | Status: SHIPPED | OUTPATIENT
Start: 2022-02-09

## 2022-02-09 RX ORDER — M-VIT,TX,IRON,MINS/CALC/FOLIC 27MG-0.4MG
1 TABLET ORAL DAILY
COMMUNITY
Start: 2022-02-09

## 2022-02-09 RX ORDER — SPIRONOLACTONE 100 MG/1
100 TABLET, FILM COATED ORAL DAILY
Qty: 30 TABLET | Refills: 3 | Status: SHIPPED | OUTPATIENT
Start: 2022-02-09 | End: 2022-03-23 | Stop reason: DRUGHIGH

## 2022-02-09 RX ORDER — POTASSIUM CHLORIDE 20 MEQ/1
40 TABLET, EXTENDED RELEASE ORAL DAILY
Status: DISCONTINUED | OUTPATIENT
Start: 2022-02-09 | End: 2022-02-09 | Stop reason: HOSPADM

## 2022-02-09 RX ADMIN — ACETAMINOPHEN 500 MG: 500 TABLET ORAL at 00:26

## 2022-02-09 RX ADMIN — SPIRONOLACTONE 100 MG: 25 TABLET ORAL at 08:47

## 2022-02-09 RX ADMIN — Medication 10 ML: at 09:17

## 2022-02-09 RX ADMIN — ALBUMIN (HUMAN) 50 G: 0.25 INJECTION, SOLUTION INTRAVENOUS at 11:30

## 2022-02-09 RX ADMIN — THIAMINE HCL TAB 100 MG 100 MG: 100 TAB at 08:47

## 2022-02-09 RX ADMIN — MULTIPLE VITAMINS W/ MINERALS TAB 1 TABLET: TAB at 08:46

## 2022-02-09 RX ADMIN — POTASSIUM CHLORIDE 40 MEQ: 1500 TABLET, EXTENDED RELEASE ORAL at 08:48

## 2022-02-09 RX ADMIN — FUROSEMIDE 40 MG: 40 TABLET ORAL at 08:48

## 2022-02-09 RX ADMIN — Medication 10 ML: at 09:18

## 2022-02-09 RX ADMIN — ATENOLOL 25 MG: 25 TABLET ORAL at 08:47

## 2022-02-09 ASSESSMENT — PAIN SCALES - GENERAL: PAINLEVEL_OUTOF10: 4

## 2022-02-09 NOTE — PROGRESS NOTES
CLINICAL PHARMACY NOTE: MEDS TO BEDS    Total # of Prescriptions Filled: 5   The following medications were delivered to the patient:  · POTASSIUM CHLORIDE 20 MEQ   · ALDACTONE 100 MG   · LASIX 40 MG   · MAG- MG   · TENORMIN 25 MG     Additional Documentation:

## 2022-02-09 NOTE — PROGRESS NOTES
Patient here for ultrasound guided paracentesis. Instructions given and questions answered. Consent signed. Abdomen scanned and marked under the guidance of procedural Radiologist.     7985 start procedure /67  71  18  96% on room air     0937 end procedure /61  65  18  97% on room air      1750 cc drained of yellow colored ascites fluid. Dry sterile dressing of 2x2 foam tape  to RLQ     Nurse to nurse called to Andrea Trejo RN  Pt sent back to the floor.

## 2022-02-09 NOTE — DISCHARGE SUMMARY
[facility] Physician Discharge Summary       [place] [id number]  [phone]    Schedule an appointment as soon as possible for a visit in 2 weeks  Post hospital follow-up with a gastroenterologist/hepatologist.    Rios Bermudez, DO  [address] [place] [facility] [address]  [phone]    Schedule an appointment as soon as possible for a visit in 1 week  Follow-up with your previous primary care posthospitalization. Activity level: As tolerated    Diet: ADULT DIET; Regular; 5 carb choices (75 gm/meal); Low Sodium (2 gm); Post Gastrectomy; 2000 ml    Labs: With your primary care doctor the near and your GI doctor. Condition at discharge: Improving    Dispo: Home    Patient ID:  Russell Reddy  [id number]  49 y.o.  1965    Admit date: 2/6/2022    Discharge date and time:  2/9/2022  11:21 AM    Admission Diagnoses: Principal Problem:    Alcoholic cirrhosis of liver with ascites ([address]  Active Problems:    Alcohol withdrawal delirium ([address]    ETOH abuse    Primary hypertension  Resolved Problems:    * No resolved hospital problems. *      Discharge Diagnoses: Principal Problem:    Alcoholic cirrhosis of liver with ascites ([address]  Active Problems:    Alcohol withdrawal delirium ([address]    ETOH abuse    Primary hypertension  Resolved Problems:    * No resolved hospital problems. *    Consults:  IP CONSULT TO SOCIAL WORK  IP CONSULT TO GI  IP CONSULT TO SOCIAL WORK  IP CONSULT TO DIETITIAN    Procedures: Thoracentesis 2/7/2022 and 2/9/2022    Hospital Course:  Cirrhosis of liver with ascites MELD score 17:    -Secondary to alcohol abuse.    -Ammonia level on admission was 22--->48, clinically without encephalopathy  -Continue Lasix and Aldactone.    -S/p Paracentesis 2/7 3900cc fluid analysis pending, 2/9 1750cc  fluid analysis pending  -Received albumin after the second paracentesis with 50 g IV piggyback  - Ferritin elevated with borderline iron overload; check HFE mutation  - Viral hep panel negative  - F/u ASMA, IGG, TETE, AMA, ceruloplasmin, alpha-1 antitrypsin  - SAAG 1.4, consistent with portal hypertension  Alcohol abuse with mild withdrawal:    -Completed CIWA scale, thiamine, MVI. -Alcohol cessation education. My discussions today revealed that he wants his father  from alcohol abuse and the explained to him that even a few beers are not good with his degree of cirrhosis and he gave a little bit more understanding that he did when I admitted him. Pancolitis:    -Seen on CT of the abdomen.    -Patient was having diarrhea prior to admission, has resolved. Hypertension:    -Continue atenolol, Lasix and Aldactone  Calcified atherosclerotic changes in abdominal aorta and iliac arteries:    -Seen on CT of the abdomen with developing aortic occlusive process but without aneurysm.    -Will need outpatient Vascular follow up.   -No statins at this time until cleared by GI  Subclinical hypothyroidism:   -TSH is 5.350 and free T4 is 1.09. Medication reconciliation was done prior to discharge all medications were reviewed with the patient. I suggested AA and the  also provided him with some information regarding alcohol cessation. Had a long discussion with his brother day discharge face-to-face and he understands the severity of the situation. Patient will also follow-up with his primary care that he is not seen in 21 years and a gastrointestinal doctor. Discharge Exam:  General Appearance: alert and oriented to person, place and time and in no acute distress  Skin: warm and dry.  Facial flushing  Head: normocephalic and atraumatic  Eyes: pupils equal, round, and reactive to light, conjunctivae normal  Neck: neck supple and non tender without mass   Pulmonary/Chest: diminished bilaterally, no respiratory distress  Cardiovascular: normal rate, normal S1 and S2   Abdomen: distended, softer, normal bowel sounds, liver palpated 3 fingers below right costal margin. See noted right lower quadrant where the most recent paracentesis was done abdomen is much softer  Extremities: no cyanosis, no clubbing and mild lower extremity edema  Neurologic: no tremor and speech normal    Vitals:    02/08/22 0852 02/08/22 1010 02/08/22 1753 02/09/22 0847   BP:   (!) 130/57 (!) 124/56   Pulse:   68 68   Resp:   16    Temp:   98.7 °F (37.1 °C)    TempSrc:   Oral    SpO2:   94%    Weight:       Height: 5' 10\" (1.778 m) 5' 10\" (1.778 m)       I/O last 3 completed shifts: In: 1020 [P.O.:1020]  Out: 350 [Urine:350]  I/O this shift:  In: 260 [P.O.:240; I.V.:20]  Out: -     LABS:  Recent Labs     02/07/22  0503 02/08/22  0523 02/09/22  0514    134 138   K 3.4* 3.5 3.2*    100 104   CO2 23 25 24   BUN 6 6 7   CREATININE 0.7 0.8 0.8   GLUCOSE 78 76 90   CALCIUM 7.9* 8.3* 8.3*     Recent Labs     02/07/22  0503 02/08/22  0523 02/09/22  0514   WBC 6.4 6.2 6.9   RBC 3.40* 3.28* 3.40*   HGB 11.8* 11.3* 11.8*   HCT 34.5* 33.0* 33.6*   .5* 100.6* 98.8   MCH 34.7 34.5 34.7   MCHC 34.2 34.2 35.1*   RDW 13.5 13.2 13.1   * 132 142   MPV 10.8 10.8 10.8     No results for input(s): POCGLU in the last 72 hours. Imaging:  CT ABDOMEN PELVIS W IV CONTRAST Additional Contrast? None    Result Date: 2/6/2022  EXAMINATION: CT OF THE ABDOMEN AND PELVIS WITH CONTRAST 2/6/2022 12:32 pm TECHNIQUE: CT of the abdomen and pelvis was performed with the administration of intravenous contrast. Multiplanar reformatted images are provided for review. Dose modulation, iterative reconstruction, and/or weight based adjustment of the mA/kV was utilized to reduce the radiation dose to as low as reasonably achievable. COMPARISON: None.  HISTORY: ORDERING SYSTEM PROVIDED HISTORY: RLQ pain radiating into chest TECHNOLOGIST PROVIDED HISTORY: Reason for exam:->RLQ pain radiating into chest Additional Contrast?->None Decision Support Exception - unselect if not a suspected or confirmed emergency medical condition->Emergency Medical Condition (MA) FINDINGS: There are findings for advanced liver parenchymal disease as seen with advanced cirrhosis with atrophy of the left lobe, lobularity of the outlines of the liver, hypertrophy of the caudate lobe with signs of portal collateral circulation throughout to the omentum. The main portal vein is patent the but it is not dilated. The main portal vein measures about 9 mm. Gallbladder is normally distended, it appears unremarkable, being partially surrounded by ascites. The biliary tree and pancreatic ductal systems are not dilated. There is unremarkable appearance for the pancreas with normal pattern of enhancement. The spleen has normal size. Noted is a collapse appearance for the entire colon but with indication for thickening of the bowel wall with some degree of edema from the cecum to the sigmoid colon, the findings are compatible with pancolitis. Uncomplicated diverticulosis seen in the sigmoid colon. Few uncomplicated diverticula is seen in the right-sided colon. There is no specific involvement of the area of the rectum. The appendix is identified with some thickening of the wall which appears to be part of the same process affecting the ascending colon. The small bowel segments are group the towards the center of the abdomen to the large volume ascites. Adrenals not enlarged. Kidneys are preserved size and cortical thickness. The there is normal enhancement for the kidneys. There is no obstruction of the kidneys. There is no renal calculus. Calcified atheromatous changes are seen throughout the abdominal aorta but there is no aneurysm formation, there is more a pattern of aortic occlusive process in the distal abdominal aorta with areas of intramural plaque formation or chronic intramural dissection distally.   Areas of stenosis are seen in the origin of both common iliac arteries with have calcifications in that area.  Atherosclerotic changes are seen in the, iliac arteries and superficial femoral arteries bilaterally. The bladder has unremarkable appearance. Prostate gland and seminal vesicles appear unremarkable. Small left-sided pleural effusions are identified in the lower lung bases. Visualized bones demonstrate mild degenerative changes in the thoracolumbar spine. 1.  Findings for advanced liver cirrhosis with large volume of ascites and portal collateral circulation. Decompensated liver cirrhosis considered. 2.  Findings for pancolitis from the cecal area through the sigmoid colon. Uncomplicated diverticula formation seen in the colon, mainly in the sigmoid colon. 3.  Calcified atherosclerotic changes in the abdominal aorta and iliac arterial systems bilaterally without aneurysm formation but with a pattern of developing aortic occlusive process. XR CHEST PORTABLE    Result Date: 2/6/2022  EXAMINATION: ONE XRAY VIEW OF THE CHEST 2/6/2022 12:08 pm COMPARISON: None. HISTORY: ORDERING SYSTEM PROVIDED HISTORY: SOB/CP TECHNOLOGIST PROVIDED HISTORY: Reason for exam:->SOB/CP FINDINGS: There is minimal basilar subsegmental atelectasis on the left. There is a density in the right lower lung possibly associated with nipple shadow or the anterior right 6th rib. No abnormality in the right lower lung on CT abdomen of 02/06/2022. heart is normal in size. No alveolar consolidation identified. Mild left basilar subsegmental atelectasis. No other acute process detected. IR US GUIDED PARACENTESIS    Result Date: 2/7/2022  PROCEDURE: PARACENTESIS WITHOUT IMAGE GUIDANCE US ABDOMEN LIMITED 2/7/2022 HISTORY: ORDERING SYSTEM PROVIDED HISTORY: Tense ascites and cirrhotic liver disease TECHNOLOGIST PROVIDED HISTORY: Reason for exam:->Tense ascites and cirrhotic liver disease TECHNIQUE: Informed consent was obtained after a detailed explanation of the procedure including risks, benefits, and alternatives.   Universal protocol was followed. A limited ultrasound of the abdomen was performed. The right abdomen was prepped and draped in sterile fashion and local anesthesia was achieved with lidocaine. An 8 [place] needle sheath was advanced into ascites and paracentesis was performed. The patient tolerated the procedure well. FINDINGS: Limited ultrasound of the abdomen demonstrates ascites. A total of 3900 cc was removed. Successful paracentesis. Patient Instructions:   Current Discharge Medication List      START taking these medications    Details   atenolol (TENORMIN) 25 MG tablet Take 1 tablet by mouth daily  Qty: 30 tablet, Refills: 3      furosemide (LASIX) 40 MG tablet Take 1 tablet by mouth daily  Qty: 60 tablet, Refills: 3      spironolactone (ALDACTONE) 100 MG tablet Take 1 tablet by mouth daily  Qty: 30 tablet, Refills: 3      Multiple Vitamins-Minerals (THERAPEUTIC MULTIVITAMIN-MINERALS) tablet Take 1 tablet by mouth daily      thiamine mononitrate (THIAMINE) 100 MG tablet Take 1 tablet by mouth daily  Refills: 0      potassium chloride (KLOR-CON M) 20 MEQ extended release tablet Take 2 tablets by mouth daily  Qty: 180 tablet, Refills: 1      Magnesium Oxide 420 MG TABS Take 420 mg by mouth daily  Qty: 30 tablet, Refills: 0         STOP taking these medications       lisinopril-hydrochlorothiazide (PRINZIDE;ZESTORETIC) 10-12.5 MG per tablet Comments:   Reason for Stopping:             Note that greater than 30 minutes was spent in preparing discharge papers, discussing discharge with patient, medication review, etc.    NOTE: This report was transcribed using voice recognition software. Every effort was made to ensure accuracy; however, inadvertent computerized transcription errors may be present.      Signed:  Electronically signed by Rani Hair, Ph.D. on 2/9/2022 at @NOW

## 2022-02-09 NOTE — PROGRESS NOTES
Physical Therapy    [id number]    Patient unavailable for physical therapy treatment due to patient had a paracentesis earlier this am and supposed to be discharged to home later on today. Fabi Zarate  [facility]  LIC # [id number]

## 2022-02-09 NOTE — CARE COORDINATION
2/9/2022: SS Note/Discharge plan:  Met with pt again to discuss alcohol rehab, pt being discharged today, says he will \"do ok\" on his own, declined wanting to speak with PRS prior to his discharge or alcohol rehab at this time, pt has addiction recovery resource list, nursing informed.  Electronically signed by ANDREWS Aquino on 2/9/2022 at 1:00 PM

## 2022-02-09 NOTE — PLAN OF CARE
Problem: Falls - Risk of:  Goal: Will remain free from falls  Description: Will remain free from falls  Outcome: Met This Shift     Problem: Falls - Risk of:  Goal: Will remain free from falls  Description: Will remain free from falls  Outcome: Met This Shift     Problem: Falls - Risk of:  Goal: Absence of physical injury  Description: Absence of physical injury  Outcome: Met This Shift     Problem: Discharge Planning:  Goal: Discharged to appropriate level of care  Description: Discharged to appropriate level of care  Outcome: Met This Shift     Problem: Fluid Volume - Deficit:  Goal: Absence of fluid volume deficit signs and symptoms  Description: Absence of fluid volume deficit signs and symptoms  Outcome: Met This Shift     Problem: Nutrition Deficit:  Goal: Ability to achieve adequate nutritional intake will improve  Description: Ability to achieve adequate nutritional intake will improve  Outcome: Met This Shift     Problem: Sleep Pattern Disturbance:  Goal: Appears well-rested  Description: Appears well-rested  Outcome: Met This Shift     Problem: Violence - Risk of, Self/Other-Directed:  Goal: Knowledge of developmental care interventions  Description: Absence of violence  Outcome: Met This Shift     Problem: Pain:  Goal: Pain level will decrease  Description: Pain level will decrease  Outcome: Met This Shift     Problem: Pain:  Goal: Control of acute pain  Description: Control of acute pain  Outcome: Met This Shift     Problem: Pain:  Goal: Control of chronic pain  Description: Control of chronic pain  Outcome: Met This Shift

## 2022-02-10 LAB
BODY FLUID CULTURE, STERILE: NORMAL
GRAM STAIN RESULT: NORMAL

## 2022-02-11 LAB
BLOOD CULTURE, ROUTINE: NORMAL
C282Y HEMOCHROMATOSIS MUT: NEGATIVE
H63D HEMOCHROMATOSIS MUT: NEGATIVE
HEMOCHROMATOSIS GENE ANALYSIS: NORMAL
HFE PCR SPECIMEN: NORMAL
S65C HEMOCHROMATOSIS MUT: NEGATIVE

## 2022-02-12 LAB — CULTURE, BLOOD 2: NORMAL

## 2022-02-20 ENCOUNTER — TELEPHONE (OUTPATIENT)
Dept: FAMILY MEDICINE CLINIC | Age: 57
End: 2022-02-20

## 2022-02-20 NOTE — TELEPHONE ENCOUNTER
This patient can be scheduled with me per his sister's request. His sister is a current patient of Blaire march.

## 2022-02-23 ENCOUNTER — OFFICE VISIT (OUTPATIENT)
Dept: FAMILY MEDICINE CLINIC | Age: 57
End: 2022-02-23
Payer: MEDICAID

## 2022-02-23 VITALS
WEIGHT: 163 LBS | RESPIRATION RATE: 20 BRPM | DIASTOLIC BLOOD PRESSURE: 72 MMHG | HEART RATE: 78 BPM | SYSTOLIC BLOOD PRESSURE: 144 MMHG | OXYGEN SATURATION: 99 % | BODY MASS INDEX: 23.34 KG/M2 | HEIGHT: 70 IN

## 2022-02-23 DIAGNOSIS — F41.9 ANXIETY: ICD-10-CM

## 2022-02-23 DIAGNOSIS — I10 PRIMARY HYPERTENSION: ICD-10-CM

## 2022-02-23 DIAGNOSIS — K70.31 ALCOHOLIC CIRRHOSIS OF LIVER WITH ASCITES (HCC): Primary | ICD-10-CM

## 2022-02-23 PROCEDURE — 1111F DSCHRG MED/CURRENT MED MERGE: CPT | Performed by: FAMILY MEDICINE

## 2022-02-23 PROCEDURE — G8420 CALC BMI NORM PARAMETERS: HCPCS | Performed by: FAMILY MEDICINE

## 2022-02-23 PROCEDURE — 3017F COLORECTAL CA SCREEN DOC REV: CPT | Performed by: FAMILY MEDICINE

## 2022-02-23 PROCEDURE — 4004F PT TOBACCO SCREEN RCVD TLK: CPT | Performed by: FAMILY MEDICINE

## 2022-02-23 PROCEDURE — G8484 FLU IMMUNIZE NO ADMIN: HCPCS | Performed by: FAMILY MEDICINE

## 2022-02-23 PROCEDURE — 99204 OFFICE O/P NEW MOD 45 MIN: CPT | Performed by: FAMILY MEDICINE

## 2022-02-23 PROCEDURE — G8427 DOCREV CUR MEDS BY ELIG CLIN: HCPCS | Performed by: FAMILY MEDICINE

## 2022-02-23 RX ORDER — LORAZEPAM 0.5 MG/1
0.5 TABLET ORAL 2 TIMES DAILY PRN
Qty: 60 TABLET | Refills: 0 | Status: SHIPPED
Start: 2022-02-23 | End: 2022-03-23 | Stop reason: ALTCHOICE

## 2022-02-23 RX ORDER — ATENOLOL 50 MG/1
50 TABLET ORAL DAILY
Qty: 30 TABLET | Refills: 3 | Status: SHIPPED
Start: 2022-02-23 | End: 2022-03-23 | Stop reason: DRUGHIGH

## 2022-02-23 SDOH — ECONOMIC STABILITY: FOOD INSECURITY: WITHIN THE PAST 12 MONTHS, THE FOOD YOU BOUGHT JUST DIDN'T LAST AND YOU DIDN'T HAVE MONEY TO GET MORE.: NEVER TRUE

## 2022-02-23 SDOH — ECONOMIC STABILITY: FOOD INSECURITY: WITHIN THE PAST 12 MONTHS, YOU WORRIED THAT YOUR FOOD WOULD RUN OUT BEFORE YOU GOT MONEY TO BUY MORE.: NEVER TRUE

## 2022-02-23 ASSESSMENT — PATIENT HEALTH QUESTIONNAIRE - PHQ9
SUM OF ALL RESPONSES TO PHQ9 QUESTIONS 1 & 2: 1
SUM OF ALL RESPONSES TO PHQ QUESTIONS 1-9: 1
2. FEELING DOWN, DEPRESSED OR HOPELESS: 0
1. LITTLE INTEREST OR PLEASURE IN DOING THINGS: 1
SUM OF ALL RESPONSES TO PHQ QUESTIONS 1-9: 1

## 2022-02-23 ASSESSMENT — ENCOUNTER SYMPTOMS
SHORTNESS OF BREATH: 0
DIARRHEA: 1
VOMITING: 0
BLOOD IN STOOL: 0
NAUSEA: 0

## 2022-02-23 ASSESSMENT — LIFESTYLE VARIABLES: HOW OFTEN DO YOU HAVE A DRINK CONTAINING ALCOHOL: NEVER

## 2022-02-23 NOTE — PATIENT INSTRUCTIONS

## 2022-02-23 NOTE — PROGRESS NOTES
Patient is a new patient here to get established. Reinaldo Baker Zupp(:  1965) is a 49 y.o. male, here for     Patient was recently admitted to [address] [place] [address] 300 earlier this month due to abdominal pain, ascites, bloating. Was diagnosed with alcoholic cirrhosis. Patient has been abstaining from alcohol. Patient required 2 paracenteses--4 liters of fluid was drained the first time. Does not know how much was drained the 2nd time. Denies nausea/vomiting. Appetite improving. stil has some abdominal discomfort. Patient has had anxiety. He had been self-medicating with cigarettes and alcohol prior to hospital stay. Was given Ativan to prevent DTs in the hospital.  Requesting something for anxiety. Patient is not currently going to [place] [id number] for alcoholism support. Recent lab results reviewed, including CMP, CBC, and TSH which are remarkable for mildly elevated TSH, hypokalemia. Review of Systems   Constitutional: Positive for chills and fatigue. Negative for fever. Eyes: Positive for visual disturbance (blurry vision with reading). Respiratory: Negative for shortness of breath. Cardiovascular: Negative for chest pain, palpitations and leg swelling. Gastrointestinal: Positive for diarrhea. Negative for blood in stool, nausea and vomiting. Genitourinary: Negative for difficulty urinating, dysuria and frequency. Skin: Negative for rash. Psychiatric/Behavioral: Negative for dysphoric mood. Prior to Visit Medications    Medication Sig Taking? Authorizing Provider   LORazepam (ATIVAN) 0.5 MG tablet Take 1 tablet by mouth 2 times daily as needed for Anxiety.  Yes Colby Mejias MD   atenolol (TENORMIN) 50 MG tablet Take 1 tablet by mouth daily Yes Colby Mejias MD   furosemide (LASIX) 40 MG tablet Take 1 tablet by mouth daily Yes Rosa Josue PA-C   spironolactone (ALDACTONE) 100 MG tablet Take 1 tablet by mouth daily Yes Rosa Josue PA-C   Multiple Vitamins-Minerals (THERAPEUTIC MULTIVITAMIN-MINERALS) tablet Take 1 tablet by mouth daily Yes Alison Ada, PA-C   thiamine mononitrate (THIAMINE) 100 MG tablet Take 1 tablet by mouth daily Yes Alison Ada, PA-C   potassium chloride (KLOR-CON M) 20 MEQ extended release tablet Take 2 tablets by mouth daily Yes Alison Ada, PA-C   Magnesium Oxide 420 MG TABS Take 420 mg by mouth daily Yes Alison Ada, PA-C        No Known Allergies    Past Medical History:   Diagnosis Date    Alcoholic cirrhosis ([facility]     Depression     ETOH abuse     Hypertension        Past Surgical History:   Procedure Laterality Date    HERNIA REPAIR Right     inguinal       Social History     Socioeconomic History    Marital status:      Spouse name: Not on file    Number of children: Not on file    Years of education: Not on file    Highest education level: Not on file   Occupational History    Not on file   Tobacco Use    Smoking status: Current Every Day Smoker     Packs/day: 0.50     Years: 20.00     Pack years: 10.00    Smokeless tobacco: Never Used   Vaping Use    Vaping Use: Never used   Substance and Sexual Activity    Alcohol use: Not Currently     Comment: OCC    Drug use: Never    Sexual activity: Not on file   Other Topics Concern    Not on file   Social History Narrative    Not on file     Social Determinants of Health     Financial Resource Strain:     Difficulty of Paying Living Expenses: Not on file   Food Insecurity: No Food Insecurity    Worried About Running Out of Food in the Last Year: Never true    Antonia of Food in the Last Year: Never true   Transportation Needs:     Lack of Transportation (Medical): Not on file    Lack of Transportation (Non-Medical): Not on file   Physical Activity:     Days of Exercise per Week: Not on file    Minutes of Exercise per Session: Not on file   Stress: Stress Concern Present    Feeling of Stress :  To some extent   Social Connections:     Frequency of Right lower leg: No edema. Left lower leg: No edema. Skin:     General: Skin is warm and dry. Neurological:      Mental Status: He is alert and oriented to person, place, and time. ASSESSMENT/PLAN:  Fredo Montez was seen today for established new doctor. Diagnoses and all orders for this visit:    Alcoholic cirrhosis of liver with ascites ([facility]        -     Continue follow-up with GI--is scheduled for EGD and colonoscopy    Primary hypertension  -     Increase atenolol (TENORMIN) 50 MG tablet; Take 1 tablet by mouth daily        -     Spironolactone 100 mg daily for management    Anxiety  -     LORazepam (ATIVAN) 0.5 MG tablet; Take 1 tablet by mouth 2 times daily as needed for Anxiety. Return in about 1 month (around 3/23/2022) for hypertension.     Sangita Arzola MD

## 2022-03-23 ENCOUNTER — OFFICE VISIT (OUTPATIENT)
Dept: FAMILY MEDICINE CLINIC | Age: 57
End: 2022-03-23
Payer: MEDICAID

## 2022-03-23 VITALS
HEIGHT: 70 IN | RESPIRATION RATE: 20 BRPM | BODY MASS INDEX: 23.91 KG/M2 | SYSTOLIC BLOOD PRESSURE: 138 MMHG | DIASTOLIC BLOOD PRESSURE: 72 MMHG | OXYGEN SATURATION: 99 % | HEART RATE: 62 BPM | WEIGHT: 167 LBS

## 2022-03-23 DIAGNOSIS — I10 PRIMARY HYPERTENSION: Primary | ICD-10-CM

## 2022-03-23 DIAGNOSIS — F51.01 PRIMARY INSOMNIA: ICD-10-CM

## 2022-03-23 DIAGNOSIS — H65.03 NON-RECURRENT ACUTE SEROUS OTITIS MEDIA OF BOTH EARS: ICD-10-CM

## 2022-03-23 PROCEDURE — G8484 FLU IMMUNIZE NO ADMIN: HCPCS | Performed by: FAMILY MEDICINE

## 2022-03-23 PROCEDURE — G8427 DOCREV CUR MEDS BY ELIG CLIN: HCPCS | Performed by: FAMILY MEDICINE

## 2022-03-23 PROCEDURE — G8420 CALC BMI NORM PARAMETERS: HCPCS | Performed by: FAMILY MEDICINE

## 2022-03-23 PROCEDURE — 99214 OFFICE O/P EST MOD 30 MIN: CPT | Performed by: FAMILY MEDICINE

## 2022-03-23 PROCEDURE — 4004F PT TOBACCO SCREEN RCVD TLK: CPT | Performed by: FAMILY MEDICINE

## 2022-03-23 PROCEDURE — 3017F COLORECTAL CA SCREEN DOC REV: CPT | Performed by: FAMILY MEDICINE

## 2022-03-23 RX ORDER — MAGNESIUM OXIDE 420 MG
420 TABLET ORAL DAILY
Qty: 30 TABLET | Refills: 5 | Status: SHIPPED | OUTPATIENT
Start: 2022-03-23

## 2022-03-23 RX ORDER — THIAMINE MONONITRATE (VIT B1) 100 MG
100 TABLET ORAL DAILY
Qty: 30 TABLET | Refills: 5 | Status: SHIPPED | OUTPATIENT
Start: 2022-03-23

## 2022-03-23 RX ORDER — PANTOPRAZOLE SODIUM 40 MG/1
TABLET, DELAYED RELEASE ORAL
COMMUNITY
Start: 2022-02-24

## 2022-03-23 RX ORDER — TRAZODONE HYDROCHLORIDE 50 MG/1
50-100 TABLET ORAL NIGHTLY
Qty: 60 TABLET | Refills: 3 | Status: SHIPPED | OUTPATIENT
Start: 2022-03-23

## 2022-03-23 RX ORDER — DICYCLOMINE HYDROCHLORIDE 10 MG/1
CAPSULE ORAL
COMMUNITY
Start: 2022-02-24

## 2022-03-23 RX ORDER — SPIRONOLACTONE 100 MG/1
100 TABLET, FILM COATED ORAL DAILY
Qty: 30 TABLET | Refills: 3 | Status: SHIPPED
Start: 2022-03-23 | End: 2022-08-09

## 2022-03-23 RX ORDER — ATENOLOL 50 MG/1
50 TABLET ORAL DAILY
Qty: 30 TABLET | Refills: 3 | Status: SHIPPED
Start: 2022-03-23 | End: 2022-06-08 | Stop reason: SDUPTHER

## 2022-03-23 RX ORDER — ALCOHOL 70.47 ML/100ML
1 GEL TOPICAL DAILY
Qty: 30 TABLET | Refills: 5 | Status: SHIPPED | OUTPATIENT
Start: 2022-03-23

## 2022-03-23 RX ORDER — PREDNISONE 20 MG/1
40 TABLET ORAL DAILY
Qty: 10 TABLET | Refills: 0 | Status: SHIPPED | OUTPATIENT
Start: 2022-03-23 | End: 2022-03-28

## 2022-03-23 RX ORDER — LORAZEPAM 0.5 MG/1
0.5 TABLET ORAL 2 TIMES DAILY PRN
Qty: 15 TABLET | Refills: 0 | Status: CANCELLED | OUTPATIENT
Start: 2022-03-23 | End: 2023-03-23

## 2022-03-23 ASSESSMENT — ENCOUNTER SYMPTOMS
NAUSEA: 0
DIARRHEA: 1
SHORTNESS OF BREATH: 0
VOMITING: 0

## 2022-03-23 NOTE — PATIENT INSTRUCTIONS

## 2022-03-23 NOTE — PROGRESS NOTES
Chief Complaint   Patient presents with    Hypertension    Results       Patient is here for follow up for hypertension    Hypertension:  Patient is here for follow up chronic hypertension. This is  generally controlled on current medication regimen. BP today is 138/72. Takes meds as directed and tolerates them well. Most recent labs reviewed with patient, including CMP, CBC, and iron studies, and are remarkable for mildly elevated AST. No symptoms from htn standpoint per ROS. Patientis  compliant with lifestyle modifications. Patient does smoke. Comorbid conditions include liver cirrhosis. Patient has had intermittent dizziness and vertigo for past 2 weeks. +ear fullness. Denies fever/chills. Patient denies having anxiety during the daytime. Has insomnia and difficulty falling asleep. Has been taking Ativan at bedtime. Patient's past medical, surgical, social and/or family history reviewed, updated inchart, and are non-contributory (unless otherwise stated). Medications and allergies also reviewed and updated in chart.       Current Outpatient Medications   Medication Sig Dispense Refill    dicyclomine (BENTYL) 10 MG capsule take 1 capsules by mouth twice a day if needed for abdominal pain      pantoprazole (PROTONIX) 40 MG tablet take 1 tablet by mouth every morning ON EMPTY STOMACH      Magnesium Oxide 420 MG TABS Take 420 mg by mouth daily 30 tablet 5    vitamin B-1 (THIAMINE) 100 MG tablet Take 1 tablet by mouth daily 30 tablet 5    traZODone (DESYREL) 50 MG tablet Take 1-2 tablets by mouth nightly 60 tablet 3    predniSONE (DELTASONE) 20 MG tablet Take 2 tablets by mouth daily for 5 days 10 tablet 0    Multiple Vitamin (THEREMS) TABS Take 1 tablet by mouth daily 30 tablet 5    atenolol (TENORMIN) 50 MG tablet Take 1 tablet by mouth daily 30 tablet 3    spironolactone (ALDACTONE) 100 MG tablet Take 1 tablet by mouth daily 30 tablet 3    LORazepam (ATIVAN) 0.5 MG tablet Take 1 tablet by mouth 2 times daily as needed for Anxiety. 60 tablet 0    furosemide (LASIX) 40 MG tablet Take 1 tablet by mouth daily 60 tablet 3    Multiple Vitamins-Minerals (THERAPEUTIC MULTIVITAMIN-MINERALS) tablet Take 1 tablet by mouth daily      potassium chloride (KLOR-CON M) 20 MEQ extended release tablet Take 2 tablets by mouth daily 180 tablet 1     No current facility-administered medications for this visit. Wt Readings from Last 3 Encounters:   03/23/22 167 lb (75.8 kg)   02/23/22 163 lb (73.9 kg)   02/08/22 161 lb 1 oz (73.1 kg)     /72   Pulse 62   Resp 20   Ht 5' 10\" (1.778 m)   Wt 167 lb (75.8 kg)   SpO2 99%   BMI 23.96 kg/m²     Review of Systems   Eyes: Positive for visual disturbance (blurry vision). Respiratory: Negative for shortness of breath. Cardiovascular: Negative for chest pain, palpitations and leg swelling. Gastrointestinal: Positive for diarrhea (comes and goes). Negative for nausea and vomiting. Genitourinary: Negative for difficulty urinating, dysuria and frequency. Skin: Negative for rash. Psychiatric/Behavioral: Negative for dysphoric mood. The patient is not nervous/anxious. Physical Exam  Vitals reviewed. Constitutional:       General: He is not in acute distress. Appearance: He is well-developed. HENT:      Right Ear: A middle ear effusion is present. Left Ear: A middle ear effusion is present. Neck:      Vascular: No carotid bruit. Cardiovascular:      Rate and Rhythm: Normal rate and regular rhythm. Heart sounds: Normal heart sounds. No murmur heard. No gallop. Pulmonary:      Effort: Pulmonary effort is normal.      Breath sounds: Normal breath sounds. No wheezing or rales. Abdominal:      General: Bowel sounds are normal. There is no distension. Palpations: Abdomen is soft. Tenderness: There is no abdominal tenderness. Musculoskeletal:      Cervical back: Neck supple. Right lower leg: No edema. Left lower leg: No edema. Skin:     General: Skin is warm and dry. Neurological:      Mental Status: He is alert and oriented to person, place, and time. Bib Simon was seen today for hypertension and results. Diagnoses and all orders for this visit:    Primary hypertension  -     atenolol (TENORMIN) 50 MG tablet; Take 1 tablet by mouth daily  -     spironolactone (ALDACTONE) 100 MG tablet; Take 1 tablet by mouth daily    Primary insomnia  -     traZODone (DESYREL) 50 MG tablet; Take 1-2 tablets by mouth nightly    Non-recurrent acute serous otitis media of both ears  -     predniSONE (DELTASONE) 20 MG tablet; Take 2 tablets by mouth daily for 5 days    Other orders  -     Magnesium Oxide 420 MG TABS; Take 420 mg by mouth daily  -     vitamin B-1 (THIAMINE) 100 MG tablet; Take 1 tablet by mouth daily  -     Multiple Vitamin (THEREMS) TABS; Take 1 tablet by mouth daily                Phone/MyChart follow up iftests abnormal.    Return in about 6 weeks (around 5/4/2022) for insomnia. , or sooner if necessary. Educational materials and/or home exercises printed for patient's review and wereincluded in patient instructions on his/her After Visit Summary and given to patient at the end of visit. Counseled regarding above diagnosis, including possible risks andcomplications,  especially if left uncontrolled. Counseled regarding the possible side effects, risks, benefits and alternatives to treatment; patient and/or guardian verbalizes understanding, agrees, feelscomfortable with and wishes to proceed with above treatment plan. Advised patient to call with any new medication issues, and read all Rx info from pharmacy to assure aware of all possible risks and side effects ofmedication before taking. Reviewed age and gender appropriate health screening exams and vaccinations.   Advised patient regarding importance of keeping up with recommended health maintenance and to schedule as soonas possible if overdue, as this is important in assessing for undiagnosed pathology, especially cancer, as well as protecting against potentially harmful/life threatening disease. Patient and/or guardianverbalizes understanding and agrees with above counseling, assessment and plan. All questions answered.

## 2022-03-28 ENCOUNTER — HOSPITAL ENCOUNTER (OUTPATIENT)
Dept: INTERVENTIONAL RADIOLOGY/VASCULAR | Age: 57
Discharge: HOME | End: 2022-03-28
Payer: MEDICAID

## 2022-03-28 DIAGNOSIS — R18.8 OTHER ASCITES: ICD-10-CM

## 2022-03-28 LAB
INR BLD: 1.3
PLATELET # BLD: 169 E9/L (ref 130–450)
PROTHROMBIN TIME: 15.1 SEC (ref 9.3–12.4)

## 2022-03-28 PROCEDURE — 36415 COLL VENOUS BLD VENIPUNCTURE: CPT

## 2022-03-28 PROCEDURE — 85049 AUTOMATED PLATELET COUNT: CPT

## 2022-03-28 PROCEDURE — 85610 PROTHROMBIN TIME: CPT

## 2022-03-28 NOTE — PROGRESS NOTES
Patient here for ultrasound guided paracentesis. Instructions given and questions answered. Consent signed. Abdomen scanned under the guidance of procedural Radiologist.      Per Dr. Archie Espinoza, there is not enough fluid to drain. Unable to proceed with paracentesis. Unable to get labwork    Patient is scheduled for the 2nd paracentesis on 4/11/22. Patient discharged home.

## 2022-04-11 ENCOUNTER — HOSPITAL ENCOUNTER (OUTPATIENT)
Dept: INTERVENTIONAL RADIOLOGY/VASCULAR | Age: 57
Discharge: HOME | End: 2022-04-11
Payer: MEDICAID

## 2022-04-11 DIAGNOSIS — R18.8 OTHER ASCITES: ICD-10-CM

## 2022-04-11 PROCEDURE — 76705 ECHO EXAM OF ABDOMEN: CPT

## 2022-04-11 NOTE — PROGRESS NOTES
Patient here for ultrasound guided paracentesis. Instructions given and questions answered. Consent signed. Abdomen scanned and marked under the guidance of procedural Radiologist.     Patient scanned by radiologist Dr. Carol Mbcride, only small amount of fluid seen, procedure not done. Pt vitals taken 203/92  63  20  99% on room air. Explained to patient that his blood pressure was very high. Patient stated he did not take all of his medication today. Advised patient to go to the emergency room due to the high blood pressure. Patient declined. Explained the importance of taking all prescribed medications. Advised patient to call his PCP and notify them of the elevated blood pressure, pt was agreeable and stated he would take the rest of his medication when he got home.  Electronically signed by Kate Jensen RN on 4/11/2022 at 2:17 PM

## 2022-06-08 ENCOUNTER — OFFICE VISIT (OUTPATIENT)
Dept: FAMILY MEDICINE CLINIC | Age: 57
End: 2022-06-08
Payer: MEDICAID

## 2022-06-08 VITALS
DIASTOLIC BLOOD PRESSURE: 64 MMHG | WEIGHT: 170 LBS | SYSTOLIC BLOOD PRESSURE: 138 MMHG | OXYGEN SATURATION: 99 % | BODY MASS INDEX: 24.34 KG/M2 | HEART RATE: 100 BPM | RESPIRATION RATE: 20 BRPM | HEIGHT: 70 IN

## 2022-06-08 DIAGNOSIS — R01.1 NEWLY RECOGNIZED HEART MURMUR: ICD-10-CM

## 2022-06-08 DIAGNOSIS — I10 PRIMARY HYPERTENSION: Primary | ICD-10-CM

## 2022-06-08 PROCEDURE — G8420 CALC BMI NORM PARAMETERS: HCPCS | Performed by: FAMILY MEDICINE

## 2022-06-08 PROCEDURE — G8427 DOCREV CUR MEDS BY ELIG CLIN: HCPCS | Performed by: FAMILY MEDICINE

## 2022-06-08 PROCEDURE — 3017F COLORECTAL CA SCREEN DOC REV: CPT | Performed by: FAMILY MEDICINE

## 2022-06-08 PROCEDURE — 99214 OFFICE O/P EST MOD 30 MIN: CPT | Performed by: FAMILY MEDICINE

## 2022-06-08 PROCEDURE — 4004F PT TOBACCO SCREEN RCVD TLK: CPT | Performed by: FAMILY MEDICINE

## 2022-06-08 RX ORDER — ATENOLOL 50 MG/1
50 TABLET ORAL NIGHTLY
Qty: 30 TABLET | Refills: 3 | Status: SHIPPED
Start: 2022-06-08 | End: 2022-06-10 | Stop reason: HOSPADM

## 2022-06-08 ASSESSMENT — ENCOUNTER SYMPTOMS
DIARRHEA: 0
NAUSEA: 0
SHORTNESS OF BREATH: 0
VOMITING: 0

## 2022-06-08 NOTE — PROGRESS NOTES
Chief Complaint   Patient presents with    Hypertension       Patient is here for follow up for hypertension    Hypertension:  Patient is here for follow up chronic hypertension. This is  generally controlled on current medication regimen. BP today is 138/64. Takes meds as directed and tolerates them well. Most recent labs reviewed with patient, including CMP, CBC, and TSH, and are remarkable for elevated liver enzymes and elevated bilirubin. No symptoms from htn standpoint per ROS. Patientis  compliant with lifestyle modifications. Patient does smoke. Comorbid conditions include liver cirrhosis. Patient's past medical, surgical, social and/or family history reviewed, updated inchart, and are non-contributory (unless otherwise stated). Medications and allergies also reviewed and updated in chart. Current Outpatient Medications   Medication Sig Dispense Refill    atenolol (TENORMIN) 50 MG tablet Take 1 tablet by mouth nightly 30 tablet 3    dicyclomine (BENTYL) 10 MG capsule take 1 capsules by mouth twice a day if needed for abdominal pain      pantoprazole (PROTONIX) 40 MG tablet take 1 tablet by mouth every morning ON EMPTY STOMACH      Magnesium Oxide 420 MG TABS Take 420 mg by mouth daily 30 tablet 5    vitamin B-1 (THIAMINE) 100 MG tablet Take 1 tablet by mouth daily 30 tablet 5    traZODone (DESYREL) 50 MG tablet Take 1-2 tablets by mouth nightly 60 tablet 3    Multiple Vitamin (THEREMS) TABS Take 1 tablet by mouth daily 30 tablet 5    spironolactone (ALDACTONE) 100 MG tablet Take 1 tablet by mouth daily 30 tablet 3    furosemide (LASIX) 40 MG tablet Take 1 tablet by mouth daily 60 tablet 3    Multiple Vitamins-Minerals (THERAPEUTIC MULTIVITAMIN-MINERALS) tablet Take 1 tablet by mouth daily      potassium chloride (KLOR-CON M) 20 MEQ extended release tablet Take 2 tablets by mouth daily 180 tablet 1     No current facility-administered medications for this visit.        Wt Readings from Last 3 Encounters:   06/08/22 170 lb (77.1 kg)   03/23/22 167 lb (75.8 kg)   02/23/22 163 lb (73.9 kg)     /64   Pulse 100   Resp 20   Ht 5' 10\" (1.778 m)   Wt 170 lb (77.1 kg)   SpO2 99%   BMI 24.39 kg/m²     Review of Systems   Eyes: Positive for visual disturbance (blurry vision). Respiratory: Negative for shortness of breath. Cardiovascular: Negative for chest pain, palpitations and leg swelling. Gastrointestinal: Negative for diarrhea, nausea and vomiting. Genitourinary: Negative for difficulty urinating, dysuria and frequency. Skin: Negative for rash. Neurological: Positive for light-headedness (comes and goes with standing). Psychiatric/Behavioral: Negative for dysphoric mood. Physical Exam  Vitals reviewed. Constitutional:       General: He is not in acute distress. Appearance: He is well-developed. Neck:      Vascular: No carotid bruit. Cardiovascular:      Rate and Rhythm: Normal rate and regular rhythm. Heart sounds: Murmur (2/6 systolic murmur left upper sternal border with radiation to bilateral carotids) heard. No gallop. Pulmonary:      Effort: Pulmonary effort is normal.      Breath sounds: Normal breath sounds. No wheezing or rales. Abdominal:      General: Bowel sounds are normal. There is no distension. Palpations: Abdomen is soft. Tenderness: There is no abdominal tenderness. Musculoskeletal:      Cervical back: Neck supple. Right lower leg: No edema. Left lower leg: No edema. Skin:     General: Skin is warm and dry. Neurological:      Mental Status: He is alert and oriented to person, place, and time. Results for orders placed or performed during the hospital encounter of 03/28/22   Platelet Count   Result Value Ref Range    Platelets [id number] - 008 E9/L   Protime-INR   Result Value Ref Range    Protime 15.1 (H) 9.3 - 12.4 sec    INR 1.3            [facility] was seen today for hypertension.     Diagnoses and all orders for this visit:    Primary hypertension  -     Lipid Panel; Future  -     atenolol (TENORMIN) 50 MG tablet; Take 1 tablet by mouth nightly    Newly recognized heart murmur  -     ECHO Complete 2D W Doppler W Color; Future              Phone/MyChart follow up iftests abnormal.    Return in about 6 weeks (around 7/20/2022) for lab/test results, hypertension. , or sooner if necessary. Educational materials and/or home exercises printed for patient's review and wereincluded in patient instructions on his/her After Visit Summary and given to patient at the end of visit. Counseled regarding above diagnosis, including possible risks andcomplications,  especially if left uncontrolled. Counseled regarding the possible side effects, risks, benefits and alternatives to treatment; patient and/or guardian verbalizes understanding, agrees, feelscomfortable with and wishes to proceed with above treatment plan. Advised patient to call with any new medication issues, and read all Rx info from pharmacy to assure aware of all possible risks and side effects ofmedication before taking. Reviewed age and gender appropriate health screening exams and vaccinations. Advised patient regarding importance of keeping up with recommended health maintenance and to schedule as soonas possible if overdue, as this is important in assessing for undiagnosed pathology, especially cancer, as well as protecting against potentially harmful/life threatening disease. Patient and/or guardianverbalizes understanding and agrees with above counseling, assessment and plan. All questions answered.

## 2022-06-08 NOTE — PATIENT INSTRUCTIONS
Patient Education        High Blood Pressure: Care Instructions  Overview     It's normal for blood pressure to go up and down throughout the day. But if it stays up, you have high blood pressure. Another name for high blood pressure ishypertension. Despite what a lot of people think, high blood pressure usually doesn't cause headaches or make you feel dizzy or lightheaded. It usually has no symptoms. But it does increase your risk of stroke, heart attack, and other problems. You and your doctor will talk about your risks of these problems based on yourblood pressure. Your doctor will give you a goal for your blood pressure. Your goal will bebased on your health and your age. Lifestyle changes, such as eating healthy and being active, are always important to help lower blood pressure. You might also take medicine to reachyour blood pressure goal.  Follow-up care is a key part of your treatment and safety. Be sure to make and go to all appointments, and call your doctor if you are having problems. It's also a good idea to know your test results and keep alist of the medicines you take. How can you care for yourself at home? Medical treatment   If you stop taking your medicine, your blood pressure will go back up. You may take one or more types of medicine to lower your blood pressure. Be safe with medicines. Take your medicine exactly as prescribed. Call your doctor if you think you are having a problem with your medicine.  Talk to your doctor before you start taking aspirin every day. Aspirin can help certain people lower their risk of a heart attack or stroke. But taking aspirin isn't right for everyone, because it can cause serious bleeding.  See your doctor regularly. You may need to see the doctor more often at first or until your blood pressure comes down.  If you are taking blood pressure medicine, talk to your doctor before you take decongestants or anti-inflammatory medicine, such as ibuprofen. Some of these medicines can raise blood pressure.  Learn how to check your blood pressure at home. Lifestyle changes   Stay at a healthy weight. This is especially important if you put on weight around the waist. Losing even 10 pounds can help you lower your blood pressure.  If your doctor recommends it, get more exercise. Walking is a good choice. Bit by bit, increase the amount you walk every day. Try for at least 30 minutes on most days of the week. You also may want to swim, bike, or do other activities.  Avoid or limit alcohol. Talk to your doctor about whether you can drink any alcohol.  Try to limit how much sodium you eat to less than 2,300 milligrams (mg) a day. Your doctor may ask you to try to eat less than 1,500 mg a day.  Eat plenty of fruits (such as bananas and oranges), vegetables, legumes, whole grains, and low-fat dairy products.  Lower the amount of saturated fat in your diet. Saturated fat is found in animal products such as milk, cheese, and meat. Limiting these foods may help you lose weight and also lower your risk for heart disease.  Do not smoke. Smoking increases your risk for heart attack and stroke. If you need help quitting, talk to your doctor about stop-smoking programs and medicines. These can increase your chances of quitting for good. When should you call for help? Call 911  anytime you think you may need emergency care. This may mean having symptoms that suggest that your blood pressure is causing a serious heart or blood vessel problem. Your blood pressure may be over 180/120. For example, call 911 if:     You have symptoms of a heart attack. These may include:  ? Chest pain or pressure, or a strange feeling in the chest.  ? Sweating. ? Shortness of breath. ? Nausea or vomiting. ? Pain, pressure, or a strange feeling in the back, neck, jaw, or upper belly or in one or both shoulders or arms. ? Lightheadedness or sudden weakness.   ? A fast or irregular heartbeat.      You have symptoms of a stroke. These may include:  ? Sudden numbness, tingling, weakness, or loss of movement in your face, arm, or leg, especially on only one side of your body. ? Sudden vision changes. ? Sudden trouble speaking. ? Sudden confusion or trouble understanding simple statements. ? Sudden problems with walking or balance. ? A sudden, severe headache that is different from past headaches.      You have severe back or belly pain. Do not wait until your blood pressure comes down on its own. Get help right away. Call your doctor now or seek immediate care if:     Your blood pressure is much higher than normal (such as 180/120 or higher), but you don't have symptoms.      You think high blood pressure is causing symptoms, such as:  ? Severe headache.  ? Blurry vision. Watch closely for changes in your health, and be sure to contact your doctor if:     Your blood pressure measures higher than your doctor recommends at least 2 times. That means the top number is higher or the bottom number is higher, or both.      You think you may be having side effects from your blood pressure medicine. Where can you learn more? Go to [email or web address]://[email or web address].[employer]. org and sign in to your [employer] account. Enter F889 in the [employer] box to learn more about \"High Blood Pressure: Care Instructions. \"     If you do not have an account, please click on the \"Sign Up Now\" link. Current as of: January 10, 2022               Content Version: 13.2  © 4194-5466 [employer]. Care instructions adapted under license by [facility] ([facility]). If you have questions about a medical condition or this instruction, always ask your healthcare professional. [place] [address] any warranty or liability for your use of this information.

## 2022-06-10 ENCOUNTER — TELEPHONE (OUTPATIENT)
Dept: FAMILY MEDICINE CLINIC | Age: 57
End: 2022-06-10

## 2022-06-10 RX ORDER — NADOLOL 40 MG/1
40 TABLET ORAL DAILY
Qty: 30 TABLET | Refills: 3 | Status: SHIPPED
Start: 2022-06-10 | End: 2022-08-09 | Stop reason: SDUPTHER

## 2022-06-10 NOTE — TELEPHONE ENCOUNTER
Received notification from Dr. B[facility] office that they request patient be switched to Nadolol instead of Atenolol to control both blood pressure and esophageal varices. Prescription was sent. Please inform patient regarding change in medication.

## 2022-08-05 ENCOUNTER — HOSPITAL ENCOUNTER (OUTPATIENT)
Age: 57
Discharge: HOME | End: 2022-08-05
Payer: MEDICAID

## 2022-08-05 ENCOUNTER — HOSPITAL ENCOUNTER (OUTPATIENT)
Dept: NON INVASIVE DIAGNOSTICS | Age: 57
Discharge: HOME | End: 2022-08-05
Payer: MEDICAID

## 2022-08-05 DIAGNOSIS — R01.1 NEWLY RECOGNIZED HEART MURMUR: ICD-10-CM

## 2022-08-05 LAB
ALBUMIN SERPL-MCNC: 3.6 G/DL (ref 3.5–5.2)
ALP BLD-CCNC: 182 U/L (ref 40–129)
ALT SERPL-CCNC: 20 U/L (ref 0–40)
ANION GAP SERPL CALCULATED.3IONS-SCNC: 9 MMOL/L (ref 7–16)
APTT: 39.5 SEC (ref 24.5–35.1)
AST SERPL-CCNC: 46 U/L (ref 0–39)
BASOPHILS ABSOLUTE: 0.13 E9/L (ref 0–0.2)
BASOPHILS RELATIVE PERCENT: 2 % (ref 0–2)
BILIRUB SERPL-MCNC: 2 MG/DL (ref 0–1.2)
BUN BLDV-MCNC: 12 MG/DL (ref 6–20)
CALCIUM SERPL-MCNC: 9.6 MG/DL (ref 8.6–10.2)
CHLORIDE BLD-SCNC: 102 MMOL/L (ref 98–107)
CO2: 26 MMOL/L (ref 22–29)
CREAT SERPL-MCNC: 0.9 MG/DL (ref 0.7–1.2)
EOSINOPHILS ABSOLUTE: 0.2 E9/L (ref 0.05–0.5)
EOSINOPHILS RELATIVE PERCENT: 3 % (ref 0–6)
GFR AFRICAN AMERICAN: >60
GFR NON-AFRICAN AMERICAN: >60 ML/MIN/1.73
GLUCOSE BLD-MCNC: 96 MG/DL (ref 74–99)
HCT VFR BLD CALC: 45.2 % (ref 37–54)
HEMOGLOBIN: 15.1 G/DL (ref 12.5–16.5)
INR BLD: 1.3
LV EF: 75 %
LVEF MODALITY: NORMAL
LYMPHOCYTES ABSOLUTE: 2.8 E9/L (ref 1.5–4)
LYMPHOCYTES RELATIVE PERCENT: 43 % (ref 20–42)
MCH RBC QN AUTO: 34.7 PG (ref 26–35)
MCHC RBC AUTO-ENTMCNC: 33.4 % (ref 32–34.5)
MCV RBC AUTO: 103.9 FL (ref 80–99.9)
MONOCYTES ABSOLUTE: 0.72 E9/L (ref 0.1–0.95)
MONOCYTES RELATIVE PERCENT: 11 % (ref 2–12)
NEUTROPHILS ABSOLUTE: 2.67 E9/L (ref 1.8–7.3)
NEUTROPHILS RELATIVE PERCENT: 41 % (ref 43–80)
PDW BLD-RTO: 14.8 FL (ref 11.5–15)
PLATELET # BLD: 135 E9/L (ref 130–450)
PMV BLD AUTO: 10.4 FL (ref 7–12)
POTASSIUM SERPL-SCNC: 5 MMOL/L (ref 3.5–5)
PROTHROMBIN TIME: 14.7 SEC (ref 9.3–12.4)
RBC # BLD: 4.35 E12/L (ref 3.8–5.8)
SODIUM BLD-SCNC: 137 MMOL/L (ref 132–146)
TOTAL PROTEIN: 8.4 G/DL (ref 6.4–8.3)
WBC # BLD: 6.5 E9/L (ref 4.5–11.5)

## 2022-08-05 PROCEDURE — 85610 PROTHROMBIN TIME: CPT

## 2022-08-05 PROCEDURE — 80053 COMPREHEN METABOLIC PANEL: CPT

## 2022-08-05 PROCEDURE — 36415 COLL VENOUS BLD VENIPUNCTURE: CPT

## 2022-08-05 PROCEDURE — 93306 TTE W/DOPPLER COMPLETE: CPT

## 2022-08-05 PROCEDURE — 85025 COMPLETE CBC W/AUTO DIFF WBC: CPT

## 2022-08-05 PROCEDURE — 82105 ALPHA-FETOPROTEIN SERUM: CPT

## 2022-08-05 PROCEDURE — 85730 THROMBOPLASTIN TIME PARTIAL: CPT

## 2022-08-06 LAB — AFP-TUMOR MARKER: 5 NG/ML (ref 0–9)

## 2022-08-09 ENCOUNTER — OFFICE VISIT (OUTPATIENT)
Dept: FAMILY MEDICINE CLINIC | Age: 57
End: 2022-08-09
Payer: MEDICAID

## 2022-08-09 VITALS
OXYGEN SATURATION: 100 % | SYSTOLIC BLOOD PRESSURE: 132 MMHG | DIASTOLIC BLOOD PRESSURE: 70 MMHG | HEIGHT: 70 IN | BODY MASS INDEX: 24.62 KG/M2 | HEART RATE: 64 BPM | WEIGHT: 172 LBS | RESPIRATION RATE: 20 BRPM

## 2022-08-09 DIAGNOSIS — I10 PRIMARY HYPERTENSION: Primary | ICD-10-CM

## 2022-08-09 DIAGNOSIS — K70.31 ALCOHOLIC CIRRHOSIS OF LIVER WITH ASCITES (HCC): ICD-10-CM

## 2022-08-09 DIAGNOSIS — L30.9 DERMATITIS: ICD-10-CM

## 2022-08-09 PROBLEM — F10.931 ALCOHOL WITHDRAWAL DELIRIUM (HCC): Status: RESOLVED | Noted: 2022-02-06 | Resolved: 2022-08-09

## 2022-08-09 PROCEDURE — G8420 CALC BMI NORM PARAMETERS: HCPCS | Performed by: FAMILY MEDICINE

## 2022-08-09 PROCEDURE — 99214 OFFICE O/P EST MOD 30 MIN: CPT | Performed by: FAMILY MEDICINE

## 2022-08-09 PROCEDURE — 4004F PT TOBACCO SCREEN RCVD TLK: CPT | Performed by: FAMILY MEDICINE

## 2022-08-09 PROCEDURE — G8427 DOCREV CUR MEDS BY ELIG CLIN: HCPCS | Performed by: FAMILY MEDICINE

## 2022-08-09 PROCEDURE — 3017F COLORECTAL CA SCREEN DOC REV: CPT | Performed by: FAMILY MEDICINE

## 2022-08-09 RX ORDER — TRIAMCINOLONE ACETONIDE 1 MG/G
CREAM TOPICAL
Qty: 80 G | Refills: 0 | Status: SHIPPED | OUTPATIENT
Start: 2022-08-09

## 2022-08-09 RX ORDER — SPIRONOLACTONE 100 MG/1
100 TABLET, FILM COATED ORAL DAILY
Qty: 30 TABLET | Refills: 3
Start: 2022-08-09

## 2022-08-09 RX ORDER — NADOLOL 40 MG/1
40 TABLET ORAL DAILY
Qty: 30 TABLET | Refills: 3 | Status: SHIPPED | OUTPATIENT
Start: 2022-08-09

## 2022-08-09 ASSESSMENT — ENCOUNTER SYMPTOMS
DIARRHEA: 0
VOMITING: 0
NAUSEA: 0
SHORTNESS OF BREATH: 0

## 2022-08-09 NOTE — PROGRESS NOTES
Chief Complaint   Patient presents with    Hypertension     Lab results       Patient is here for follow up for hypertension    Hypertension:  Patient is here for follow up chronic hypertension. This is  generally controlled on current medication regimen. BP today is 132/70. Patient unsure which of his blood pressure medications he is taking. Pharmacy would not fill his medications. Most recent labs reviewed with patient, including CMP, CBC with and are remarkable for elevated alkaline phosphatase, macrocytosis. No symptoms from htn standpoint per ROS. Patientis not compliant with lifestyle modifications. Patient does smoke. Comorbid conditions include alcoholic cirrhosis. Patient following up with GI for alcoholic cirrhosis. Recent 2D echocardiogram results reviewed and showed mild diastolic dysfunction. Patient's past medical, surgical, social and/or family history reviewed, updated inchart, and are non-contributory (unless otherwise stated). Medications and allergies also reviewed and updated in chart. Current Outpatient Medications   Medication Sig Dispense Refill    nadolol (CORGARD) 40 MG tablet Take 1 tablet by mouth in the morning. 30 tablet 3    triamcinolone (KENALOG) 0.1 % cream Apply topically 2 times daily for up to 2 weeks then as needed for flareups. 80 g 0    spironolactone (ALDACTONE) 100 MG tablet Take 1 tablet by mouth in the morning.  30 tablet 3    dicyclomine (BENTYL) 10 MG capsule take 1 capsules by mouth twice a day if needed for abdominal pain      pantoprazole (PROTONIX) 40 MG tablet take 1 tablet by mouth every morning ON EMPTY STOMACH      Magnesium Oxide 420 MG TABS Take 420 mg by mouth daily 30 tablet 5    vitamin B-1 (THIAMINE) 100 MG tablet Take 1 tablet by mouth daily 30 tablet 5    traZODone (DESYREL) 50 MG tablet Take 1-2 tablets by mouth nightly 60 tablet 3    Multiple Vitamin (THEREMS) TABS Take 1 tablet by mouth daily 30 tablet 5    furosemide (LASIX) 40 MG tablet Take 1 tablet by mouth daily 60 tablet 3    Multiple Vitamins-Minerals (THERAPEUTIC MULTIVITAMIN-MINERALS) tablet Take 1 tablet by mouth daily      potassium chloride (KLOR-CON M) 20 MEQ extended release tablet Take 2 tablets by mouth daily 180 tablet 1     No current facility-administered medications for this visit. Wt Readings from Last 3 Encounters:   08/09/22 172 lb (78 kg)   06/08/22 170 lb (77.1 kg)   03/23/22 167 lb (75.8 kg)     /70   Pulse 64   Resp 20   Ht 5' 10\" (1.778 m)   Wt 172 lb (78 kg)   SpO2 100%   BMI 24.68 kg/m²     Review of Systems   Eyes:  Positive for visual disturbance. Respiratory:  Negative for shortness of breath. Cardiovascular:  Negative for chest pain, palpitations and leg swelling. Gastrointestinal:  Negative for diarrhea, nausea and vomiting. Genitourinary:  Negative for difficulty urinating, dysuria and frequency. Skin:  Positive for rash (both arms, non-itchy). Neurological:  Positive for light-headedness (comes and goes with standing). Psychiatric/Behavioral:  Negative for dysphoric mood. Physical Exam  Vitals reviewed. Constitutional:       General: He is not in acute distress. Appearance: He is well-developed. Neck:      Vascular: No carotid bruit. Cardiovascular:      Rate and Rhythm: Normal rate and regular rhythm. Heart sounds: Normal heart sounds. No murmur heard. No gallop. Pulmonary:      Effort: Pulmonary effort is normal.      Breath sounds: Normal breath sounds. No wheezing or rales. Abdominal:      General: Bowel sounds are normal. There is no distension. Palpations: Abdomen is soft. Tenderness: There is no abdominal tenderness. Musculoskeletal:      Cervical back: Neck supple. Right lower leg: No edema. Left lower leg: No edema. Skin:     General: Skin is warm and dry. Neurological:      Mental Status: He is alert and oriented to person, place, and time.        Results for orders placed or performed during the hospital encounter of 08/05/22   Comprehensive Metabolic Panel   Result Value Ref Range    Sodium 137 132 - 146 mmol/L    Potassium 5.0 3.5 - 5.0 mmol/L    Chloride 102 98 - 107 mmol/L    CO2 26 22 - 29 mmol/L    Anion Gap 9 7 - 16 mmol/L    Glucose 96 74 - 99 mg/dL    BUN 12 6 - 20 mg/dL    Creatinine 0.9 0.7 - 1.2 mg/dL    GFR Non-African American >60 >=60 mL/min/1.73    GFR African American >60     Calcium 9.6 8.6 - 10.2 mg/dL    Total Protein 8.4 (H) 6.4 - 8.3 g/dL    Albumin 3.6 3.5 - 5.2 g/dL    Total Bilirubin 2.0 (H) 0.0 - 1.2 mg/dL    Alkaline Phosphatase 182 (H) 40 - 129 U/L    ALT 20 0 - 40 U/L    AST 46 (H) 0 - 39 U/L   CBC with Auto Differential   Result Value Ref Range    WBC 6.5 4.5 - 11.5 E9/L    RBC 4.35 3.80 - 5.80 E12/L    Hemoglobin 15.1 12.5 - 16.5 g/dL    Hematocrit 45.2 37.0 - 54.0 %    .9 (H) 80.0 - 99.9 fL    MCH 34.7 26.0 - 35.0 pg    MCHC 33.4 32.0 - 34.5 %    RDW 14.8 11.5 - 15.0 fL    Platelets 912 451 - 605 E9/L    MPV 10.4 7.0 - 12.0 fL    Neutrophils % 41.0 (L) 43.0 - 80.0 %    Lymphocytes % 43.0 (H) 20.0 - 42.0 %    Monocytes % 11.0 2.0 - 12.0 %    Eosinophils % 3.0 0.0 - 6.0 %    Basophils % 2.0 0.0 - 2.0 %    Neutrophils Absolute 2.67 1.80 - 7.30 E9/L    Lymphocytes Absolute 2.80 1.50 - 4.00 E9/L    Monocytes Absolute 0.72 0.10 - 0.95 E9/L    Eosinophils Absolute 0.20 0.05 - 0.50 E9/L    Basophils Absolute 0.13 0.00 - 0.20 E9/L   AFP Tumor Marker   Result Value Ref Range    AFP-Tumor Marker 5 0 - 9 ng/mL   Protime-INR   Result Value Ref Range    Protime 14.7 (H) 9.3 - 12.4 sec    INR 1.3    APTT   Result Value Ref Range    aPTT 39.5 (H) 24.5 - 35.1 sec           Jaylyn Herrera was seen today for hypertension. Diagnoses and all orders for this visit:    Primary hypertension  -     nadolol (CORGARD) 40 MG tablet; Take 1 tablet by mouth in the morning.  -     spironolactone (ALDACTONE) 100 MG tablet;  Take 1 tablet by mouth in the morning. Alcoholic cirrhosis of liver with ascites (HCC)  -     spironolactone (ALDACTONE) 100 MG tablet; Take 1 tablet by mouth in the morning. Dermatitis  -     triamcinolone (KENALOG) 0.1 % cream; Apply topically 2 times daily for up to 2 weeks then as needed for flareups. Phone/MyChart follow up iftests abnormal.    Return in about 3 months (around 11/9/2022) for hypertension. , or sooner if necessary. Educational materials and/or home exercises printed for patient's review and wereincluded in patient instructions on his/her After Visit Summary and given to patient at the end of visit. Counseled regarding above diagnosis, including possible risks andcomplications,  especially if left uncontrolled. Counseled regarding the possible side effects, risks, benefits and alternatives to treatment; patient and/or guardian verbalizes understanding, agrees, feelscomfortable with and wishes to proceed with above treatment plan. Advised patient to call with any new medication issues, and read all Rx info from pharmacy to assure aware of all possible risks and side effects ofmedication before taking. Reviewed age and gender appropriate health screening exams and vaccinations. Advised patient regarding importance of keeping up with recommended health maintenance and to schedule as soonas possible if overdue, as this is important in assessing for undiagnosed pathology, especially cancer, as well as protecting against potentially harmful/life threatening disease. Patient and/or guardianverbalizes understanding and agrees with above counseling, assessment and plan. All questions answered.

## 2022-09-06 ENCOUNTER — HOSPITAL ENCOUNTER (OUTPATIENT)
Dept: ULTRASOUND IMAGING | Age: 57
Discharge: HOME | End: 2022-09-06
Payer: MEDICAID

## 2022-09-06 DIAGNOSIS — K74.60 CIRRHOSIS OF LIVER WITHOUT ASCITES, UNSPECIFIED HEPATIC CIRRHOSIS TYPE (HCC): ICD-10-CM

## 2022-09-06 DIAGNOSIS — R18.8 OTHER ASCITES: ICD-10-CM

## 2022-09-06 PROCEDURE — 76705 ECHO EXAM OF ABDOMEN: CPT

## 2022-11-10 ENCOUNTER — OFFICE VISIT (OUTPATIENT)
Dept: FAMILY MEDICINE CLINIC | Age: 57
End: 2022-11-10
Payer: MEDICAID

## 2022-11-10 VITALS
RESPIRATION RATE: 20 BRPM | WEIGHT: 182 LBS | DIASTOLIC BLOOD PRESSURE: 80 MMHG | BODY MASS INDEX: 26.05 KG/M2 | HEART RATE: 68 BPM | HEIGHT: 70 IN | OXYGEN SATURATION: 97 % | SYSTOLIC BLOOD PRESSURE: 132 MMHG

## 2022-11-10 DIAGNOSIS — I10 PRIMARY HYPERTENSION: Primary | ICD-10-CM

## 2022-11-10 DIAGNOSIS — Z12.5 PROSTATE CANCER SCREENING: ICD-10-CM

## 2022-11-10 PROCEDURE — G8427 DOCREV CUR MEDS BY ELIG CLIN: HCPCS | Performed by: FAMILY MEDICINE

## 2022-11-10 PROCEDURE — 3017F COLORECTAL CA SCREEN DOC REV: CPT | Performed by: FAMILY MEDICINE

## 2022-11-10 PROCEDURE — G8419 CALC BMI OUT NRM PARAM NOF/U: HCPCS | Performed by: FAMILY MEDICINE

## 2022-11-10 PROCEDURE — 3074F SYST BP LT 130 MM HG: CPT | Performed by: FAMILY MEDICINE

## 2022-11-10 PROCEDURE — 99214 OFFICE O/P EST MOD 30 MIN: CPT | Performed by: FAMILY MEDICINE

## 2022-11-10 PROCEDURE — G8484 FLU IMMUNIZE NO ADMIN: HCPCS | Performed by: FAMILY MEDICINE

## 2022-11-10 PROCEDURE — 4004F PT TOBACCO SCREEN RCVD TLK: CPT | Performed by: FAMILY MEDICINE

## 2022-11-10 PROCEDURE — 3078F DIAST BP <80 MM HG: CPT | Performed by: FAMILY MEDICINE

## 2022-11-10 RX ORDER — TRIAMCINOLONE ACETONIDE 1 MG/G
CREAM TOPICAL
Qty: 80 G | Refills: 5 | Status: SHIPPED | OUTPATIENT
Start: 2022-11-10

## 2022-11-10 RX ORDER — NADOLOL 40 MG/1
40 TABLET ORAL DAILY
Qty: 30 TABLET | Refills: 5 | Status: SHIPPED | OUTPATIENT
Start: 2022-11-10

## 2022-11-10 RX ORDER — MAGNESIUM OXIDE 420 MG/1
420 TABLET ORAL DAILY
Qty: 30 TABLET | Refills: 5 | Status: SHIPPED | OUTPATIENT
Start: 2022-11-10

## 2022-11-10 ASSESSMENT — ENCOUNTER SYMPTOMS
SHORTNESS OF BREATH: 0
VOMITING: 0
DIARRHEA: 0
NAUSEA: 0

## 2022-11-10 NOTE — PROGRESS NOTES
Chief Complaint   Patient presents with    Hypertension     Dizzy, blurred vision        Patient is here for follow up for hypertension    Hypertension:  Patient is here for follow up chronic hypertension. This is  generally controlled on current medication regimen. BP today is 132/80. Takes meds as directed and tolerates them well. No symptoms from htn standpoint per ROS. Patientis not compliant with lifestyle modifications. Patient does smoke. Comorbid conditions include hepatitis C. Patient's past medical, surgical, social and/or family history reviewed, updated inchart, and are non-contributory (unless otherwise stated). Medications and allergies also reviewed and updated in chart. Current Outpatient Medications   Medication Sig Dispense Refill    triamcinolone (KENALOG) 0.1 % cream Apply topically 2 times daily for up to 2 weeks then as needed for flareups. 80 g 5    nadolol (CORGARD) 40 MG tablet Take 1 tablet by mouth daily 30 tablet 5    Magnesium Oxide 420 MG TABS Take 420 mg by mouth daily 30 tablet 5    spironolactone (ALDACTONE) 100 MG tablet Take 1 tablet by mouth in the morning. 30 tablet 3    dicyclomine (BENTYL) 10 MG capsule take 1 capsules by mouth twice a day if needed for abdominal pain      pantoprazole (PROTONIX) 40 MG tablet take 1 tablet by mouth every morning ON EMPTY STOMACH      vitamin B-1 (THIAMINE) 100 MG tablet Take 1 tablet by mouth daily 30 tablet 5    traZODone (DESYREL) 50 MG tablet Take 1-2 tablets by mouth nightly 60 tablet 3    Multiple Vitamin (THEREMS) TABS Take 1 tablet by mouth daily 30 tablet 5    furosemide (LASIX) 40 MG tablet Take 1 tablet by mouth daily 60 tablet 3    Multiple Vitamins-Minerals (THERAPEUTIC MULTIVITAMIN-MINERALS) tablet Take 1 tablet by mouth daily      potassium chloride (KLOR-CON M) 20 MEQ extended release tablet Take 2 tablets by mouth daily 180 tablet 1     No current facility-administered medications for this visit.        Wt Readings from Last 3 Encounters:   11/10/22 182 lb (82.6 kg)   08/09/22 172 lb (78 kg)   06/08/22 170 lb (77.1 kg)     /80   Pulse 68   Resp 20   Ht 5' 10\" (1.778 m)   Wt 182 lb (82.6 kg)   SpO2 97%   BMI 26.11 kg/m²     Review of Systems   Eyes:  Positive for visual disturbance (blurry vision comes and goes). Respiratory:  Negative for shortness of breath. Cardiovascular:  Positive for leg swelling (comes and goes). Negative for chest pain and palpitations. Gastrointestinal:  Negative for diarrhea, nausea and vomiting. Genitourinary:  Negative for difficulty urinating, dysuria and frequency. Skin:  Negative for rash. Psychiatric/Behavioral:  Negative for dysphoric mood. Physical Exam  Vitals reviewed. Constitutional:       General: He is not in acute distress. Appearance: He is well-developed. Neck:      Vascular: No carotid bruit. Cardiovascular:      Rate and Rhythm: Normal rate and regular rhythm. Heart sounds: Normal heart sounds. No murmur heard. No gallop. Pulmonary:      Effort: Pulmonary effort is normal.      Breath sounds: Normal breath sounds. No wheezing or rales. Abdominal:      General: Bowel sounds are normal. There is no distension. Palpations: Abdomen is soft. Tenderness: There is no abdominal tenderness. Musculoskeletal:      Cervical back: Neck supple. Right lower leg: No edema. Left lower leg: No edema. Skin:     General: Skin is warm and dry. Neurological:      Mental Status: He is alert and oriented to person, place, and time. Narda Montoya was seen today for hypertension. Diagnoses and all orders for this visit:    Primary hypertension  -     nadolol (CORGARD) 40 MG tablet; Take 1 tablet by mouth daily  -     Lipid Panel; Future  -     TSH; Future    Prostate cancer screening  -     PSA Screening;  Future      Phone/MyChart follow up iftests abnormal.    Return in about 3 months (around 2/10/2023) for hypertension. , or sooner if necessary. Educational materials and/or home exercises printed for patient's review and wereincluded in patient instructions on his/her After Visit Summary and given to patient at the end of visit. Counseled regarding above diagnosis, including possible risks andcomplications,  especially if left uncontrolled. Counseled regarding the possible side effects, risks, benefits and alternatives to treatment; patient and/or guardian verbalizes understanding, agrees, feelscomfortable with and wishes to proceed with above treatment plan. Advised patient to call with any new medication issues, and read all Rx info from pharmacy to assure aware of all possible risks and side effects ofmedication before taking. Reviewed age and gender appropriate health screening exams and vaccinations. Advised patient regarding importance of keeping up with recommended health maintenance and to schedule as soonas possible if overdue, as this is important in assessing for undiagnosed pathology, especially cancer, as well as protecting against potentially harmful/life threatening disease. Patient and/or guardianverbalizes understanding and agrees with above counseling, assessment and plan. All questions answered.

## 2022-12-07 ENCOUNTER — HOSPITAL ENCOUNTER (OUTPATIENT)
Age: 57
Discharge: HOME | End: 2022-12-07
Payer: MEDICAID

## 2022-12-07 DIAGNOSIS — I10 PRIMARY HYPERTENSION: ICD-10-CM

## 2022-12-07 DIAGNOSIS — Z12.5 PROSTATE CANCER SCREENING: ICD-10-CM

## 2022-12-07 LAB
CHOLESTEROL, TOTAL: 145 MG/DL (ref 0–199)
HDLC SERPL-MCNC: 32 MG/DL
LDL CHOLESTEROL CALCULATED: 94 MG/DL (ref 0–99)
PROSTATE SPECIFIC ANTIGEN: 0.19 NG/ML (ref 0–4)
TRIGL SERPL-MCNC: 95 MG/DL (ref 0–149)
TSH SERPL DL<=0.05 MIU/L-ACNC: 2.14 UIU/ML (ref 0.27–4.2)
VLDLC SERPL CALC-MCNC: 19 MG/DL

## 2022-12-07 PROCEDURE — 80061 LIPID PANEL: CPT

## 2022-12-07 PROCEDURE — 84443 ASSAY THYROID STIM HORMONE: CPT

## 2022-12-07 PROCEDURE — 36415 COLL VENOUS BLD VENIPUNCTURE: CPT

## 2022-12-07 PROCEDURE — G0103 PSA SCREENING: HCPCS

## 2023-01-23 RX ORDER — ASPIRIN 325 MG/1
TABLET, FILM COATED ORAL
Qty: 30 TABLET | Refills: 2 | Status: SHIPPED
Start: 2023-01-23 | End: 2023-02-16

## 2023-02-16 ENCOUNTER — OFFICE VISIT (OUTPATIENT)
Dept: FAMILY MEDICINE CLINIC | Age: 58
End: 2023-02-16
Payer: MEDICAID

## 2023-02-16 VITALS
HEIGHT: 70 IN | OXYGEN SATURATION: 100 % | DIASTOLIC BLOOD PRESSURE: 70 MMHG | RESPIRATION RATE: 20 BRPM | SYSTOLIC BLOOD PRESSURE: 134 MMHG | BODY MASS INDEX: 25.34 KG/M2 | HEART RATE: 76 BPM | WEIGHT: 177 LBS

## 2023-02-16 DIAGNOSIS — I10 PRIMARY HYPERTENSION: Primary | ICD-10-CM

## 2023-02-16 DIAGNOSIS — F51.01 PRIMARY INSOMNIA: ICD-10-CM

## 2023-02-16 PROCEDURE — G8484 FLU IMMUNIZE NO ADMIN: HCPCS | Performed by: FAMILY MEDICINE

## 2023-02-16 PROCEDURE — G8419 CALC BMI OUT NRM PARAM NOF/U: HCPCS | Performed by: FAMILY MEDICINE

## 2023-02-16 PROCEDURE — 3074F SYST BP LT 130 MM HG: CPT | Performed by: FAMILY MEDICINE

## 2023-02-16 PROCEDURE — 3017F COLORECTAL CA SCREEN DOC REV: CPT | Performed by: FAMILY MEDICINE

## 2023-02-16 PROCEDURE — 3078F DIAST BP <80 MM HG: CPT | Performed by: FAMILY MEDICINE

## 2023-02-16 PROCEDURE — 4004F PT TOBACCO SCREEN RCVD TLK: CPT | Performed by: FAMILY MEDICINE

## 2023-02-16 PROCEDURE — G8427 DOCREV CUR MEDS BY ELIG CLIN: HCPCS | Performed by: FAMILY MEDICINE

## 2023-02-16 PROCEDURE — 99214 OFFICE O/P EST MOD 30 MIN: CPT | Performed by: FAMILY MEDICINE

## 2023-02-16 RX ORDER — LORAZEPAM 0.5 MG/1
0.5 TABLET ORAL NIGHTLY PRN
Qty: 30 TABLET | Refills: 2 | Status: SHIPPED | OUTPATIENT
Start: 2023-02-16 | End: 2024-02-16

## 2023-02-16 RX ORDER — PANTOPRAZOLE SODIUM 40 MG/1
TABLET, DELAYED RELEASE ORAL
Qty: 30 TABLET | Refills: 5 | Status: SHIPPED | OUTPATIENT
Start: 2023-02-16

## 2023-02-16 SDOH — ECONOMIC STABILITY: HOUSING INSECURITY
IN THE LAST 12 MONTHS, WAS THERE A TIME WHEN YOU DID NOT HAVE A STEADY PLACE TO SLEEP OR SLEPT IN A SHELTER (INCLUDING NOW)?: NO

## 2023-02-16 SDOH — ECONOMIC STABILITY: FOOD INSECURITY: WITHIN THE PAST 12 MONTHS, THE FOOD YOU BOUGHT JUST DIDN'T LAST AND YOU DIDN'T HAVE MONEY TO GET MORE.: NEVER TRUE

## 2023-02-16 SDOH — ECONOMIC STABILITY: FOOD INSECURITY: WITHIN THE PAST 12 MONTHS, YOU WORRIED THAT YOUR FOOD WOULD RUN OUT BEFORE YOU GOT MONEY TO BUY MORE.: NEVER TRUE

## 2023-02-16 SDOH — ECONOMIC STABILITY: INCOME INSECURITY: HOW HARD IS IT FOR YOU TO PAY FOR THE VERY BASICS LIKE FOOD, HOUSING, MEDICAL CARE, AND HEATING?: NOT HARD AT ALL

## 2023-02-16 ASSESSMENT — PATIENT HEALTH QUESTIONNAIRE - PHQ9
SUM OF ALL RESPONSES TO PHQ QUESTIONS 1-9: 1
1. LITTLE INTEREST OR PLEASURE IN DOING THINGS: 1
SUM OF ALL RESPONSES TO PHQ9 QUESTIONS 1 & 2: 1
2. FEELING DOWN, DEPRESSED OR HOPELESS: 0
SUM OF ALL RESPONSES TO PHQ QUESTIONS 1-9: 1

## 2023-02-16 ASSESSMENT — ENCOUNTER SYMPTOMS
DIARRHEA: 0
VOMITING: 0
SHORTNESS OF BREATH: 0
NAUSEA: 0

## 2023-02-16 NOTE — PROGRESS NOTES
Chief Complaint   Patient presents with    Hypertension     Pt request wants nerve pill back , stopped trazodone        Patient is here for follow up for hypertension    Hypertension:  Patient is here for follow up chronic hypertension. This is  generally controlled on current medication regimen. BP today is 134/70. Takes meds as directed and tolerates them well. Most recent labs reviewed with patient, including CMP, CBC, TSH, and lipid panel, and are not remarkable. No symptoms from htn standpoint per ROS. Patientis  compliant with lifestyle modifications. Patient does smoke. Comorbid conditions include liver cirrhosis. Patient still following up with all specialists for GI due to cirrhosis. States he has not drank any alcohol in a year. Patient still having difficulty sleeping. No improvement with trazodone. Wants to retry Ativan since it worked in the hospital previously. Patient's past medical, surgical, social and/or family history reviewed, updated inchart, and are non-contributory (unless otherwise stated). Medications and allergies also reviewed and updated in chart. Current Outpatient Medications   Medication Sig Dispense Refill    nadolol (CORGARD) 40 MG tablet Take 1 tablet by mouth daily 30 tablet 5    pantoprazole (PROTONIX) 40 MG tablet take 1 tablet by mouth every morning ON EMPTY STOMACH 30 tablet 5    LORazepam (ATIVAN) 0.5 MG tablet Take 1 tablet by mouth nightly as needed for Anxiety. Max Daily Amount: 0.5 mg 30 tablet 2    triamcinolone (KENALOG) 0.1 % cream Apply topically 2 times daily for up to 2 weeks then as needed for flareups. 80 g 5    Magnesium Oxide 420 MG TABS Take 420 mg by mouth daily 30 tablet 5    spironolactone (ALDACTONE) 100 MG tablet Take 1 tablet by mouth in the morning.  30 tablet 3    dicyclomine (BENTYL) 10 MG capsule take 1 capsules by mouth twice a day if needed for abdominal pain      Multiple Vitamin (THEREMS) TABS Take 1 tablet by mouth daily 30 tablet 5    furosemide (LASIX) 40 MG tablet Take 1 tablet by mouth daily 60 tablet 3    potassium chloride (KLOR-CON M) 20 MEQ extended release tablet Take 2 tablets by mouth daily 180 tablet 1     No current facility-administered medications for this visit. Wt Readings from Last 3 Encounters:   02/16/23 177 lb (80.3 kg)   11/10/22 182 lb (82.6 kg)   08/09/22 172 lb (78 kg)     /70   Pulse 76   Resp 20   Ht 5' 10\" (1.778 m)   Wt 177 lb (80.3 kg)   SpO2 100%   BMI 25.40 kg/m²     Review of Systems   Eyes:  Positive for visual disturbance (blurry vision). Respiratory:  Negative for shortness of breath. Cardiovascular:  Negative for chest pain, palpitations and leg swelling. Gastrointestinal:  Negative for diarrhea, nausea and vomiting. Genitourinary:  Negative for difficulty urinating, dysuria and frequency. Skin:  Negative for rash. Psychiatric/Behavioral:  Positive for sleep disturbance. Negative for dysphoric mood. Physical Exam  Vitals reviewed. Constitutional:       General: He is not in acute distress. Appearance: He is well-developed. Neck:      Vascular: No carotid bruit. Cardiovascular:      Rate and Rhythm: Normal rate and regular rhythm. Heart sounds: Normal heart sounds. No murmur heard. No gallop. Pulmonary:      Effort: Pulmonary effort is normal.      Breath sounds: Normal breath sounds. No wheezing or rales. Abdominal:      General: Bowel sounds are normal. There is no distension. Palpations: Abdomen is soft. Tenderness: There is no abdominal tenderness. Musculoskeletal:      Cervical back: Neck supple. Right lower leg: No edema. Left lower leg: No edema. Skin:     General: Skin is warm and dry. Neurological:      Mental Status: He is alert and oriented to person, place, and time.        Results for orders placed or performed during the hospital encounter of 12/07/22   PSA Screening   Result Value Ref Range    PSA 0. 19 0.00 - 4.00 ng/mL   TSH   Result Value Ref Range    TSH 2.140 0.270 - 4.200 uIU/mL   Lipid Panel   Result Value Ref Range    Cholesterol, Total 145 0 - 199 mg/dL    Triglycerides 95 0 - 149 mg/dL    HDL 32 >40 mg/dL    LDL Calculated 94 0 - 99 mg/dL    VLDL Cholesterol Calculated 19 mg/dL           Heidi Whaley was seen today for hypertension. Diagnoses and all orders for this visit:    Primary hypertension  -     nadolol (CORGARD) 40 MG tablet; Take 1 tablet by mouth daily    Primary insomnia  -     LORazepam (ATIVAN) 0.5 MG tablet; Take 1 tablet by mouth nightly as needed for Anxiety. Max Daily Amount: 0.5 mg            Phone/MyChart follow up iftests abnormal.    Return in about 6 weeks (around 3/30/2023) for insomnia. , or sooner if necessary. Educational materials and/or home exercises printed for patient's review and wereincluded in patient instructions on his/her After Visit Summary and given to patient at the end of visit. Counseled regarding above diagnosis, including possible risks andcomplications,  especially if left uncontrolled. Counseled regarding the possible side effects, risks, benefits and alternatives to treatment; patient and/or guardian verbalizes understanding, agrees, feelscomfortable with and wishes to proceed with above treatment plan. Advised patient to call with any new medication issues, and read all Rx info from pharmacy to assure aware of all possible risks and side effects ofmedication before taking. Reviewed age and gender appropriate health screening exams and vaccinations. Advised patient regarding importance of keeping up with recommended health maintenance and to schedule as soonas possible if overdue, as this is important in assessing for undiagnosed pathology, especially cancer, as well as protecting against potentially harmful/life threatening disease.         Patient and/or guardianverbalizes understanding and agrees with above counseling, assessment and plan. All questions answered.

## 2023-02-17 RX ORDER — NADOLOL 40 MG/1
40 TABLET ORAL DAILY
Qty: 30 TABLET | Refills: 5
Start: 2023-02-17

## 2023-02-21 ENCOUNTER — HOSPITAL ENCOUNTER (OUTPATIENT)
Dept: ULTRASOUND IMAGING | Age: 58
Discharge: HOME | End: 2023-02-21
Payer: MEDICAID

## 2023-02-21 ENCOUNTER — HOSPITAL ENCOUNTER (OUTPATIENT)
Age: 58
Discharge: HOME | End: 2023-02-21
Payer: MEDICAID

## 2023-02-21 DIAGNOSIS — K74.60 HEPATIC CIRRHOSIS, UNSPECIFIED HEPATIC CIRRHOSIS TYPE, UNSPECIFIED WHETHER ASCITES PRESENT (HCC): ICD-10-CM

## 2023-02-21 LAB
ALBUMIN SERPL-MCNC: 3.2 G/DL (ref 3.5–5.2)
ALP BLD-CCNC: 214 U/L (ref 40–129)
ALT SERPL-CCNC: 22 U/L (ref 0–40)
ANION GAP SERPL CALCULATED.3IONS-SCNC: 7 MMOL/L (ref 7–16)
APTT: 40.5 SEC (ref 24.5–35.1)
AST SERPL-CCNC: 45 U/L (ref 0–39)
BASOPHILS ABSOLUTE: 0.1 E9/L (ref 0–0.2)
BASOPHILS RELATIVE PERCENT: 1.4 % (ref 0–2)
BILIRUB SERPL-MCNC: 1.2 MG/DL (ref 0–1.2)
BUN BLDV-MCNC: 20 MG/DL (ref 6–20)
CALCIUM SERPL-MCNC: 8.9 MG/DL (ref 8.6–10.2)
CHLORIDE BLD-SCNC: 106 MMOL/L (ref 98–107)
CO2: 26 MMOL/L (ref 22–29)
CREAT SERPL-MCNC: 0.9 MG/DL (ref 0.7–1.2)
EOSINOPHILS ABSOLUTE: 0.57 E9/L (ref 0.05–0.5)
EOSINOPHILS RELATIVE PERCENT: 8.2 % (ref 0–6)
GFR SERPL CREATININE-BSD FRML MDRD: >60 ML/MIN/1.73
GLUCOSE BLD-MCNC: 96 MG/DL (ref 74–99)
HCT VFR BLD CALC: 41.7 % (ref 37–54)
HEMOGLOBIN: 13.9 G/DL (ref 12.5–16.5)
IMMATURE GRANULOCYTES #: 0.02 E9/L
IMMATURE GRANULOCYTES %: 0.3 % (ref 0–5)
INR BLD: 1.3
LYMPHOCYTES ABSOLUTE: 2.31 E9/L (ref 1.5–4)
LYMPHOCYTES RELATIVE PERCENT: 33.4 % (ref 20–42)
MCH RBC QN AUTO: 34.7 PG (ref 26–35)
MCHC RBC AUTO-ENTMCNC: 33.3 % (ref 32–34.5)
MCV RBC AUTO: 104 FL (ref 80–99.9)
MONOCYTES ABSOLUTE: 0.84 E9/L (ref 0.1–0.95)
MONOCYTES RELATIVE PERCENT: 12.2 % (ref 2–12)
NEUTROPHILS ABSOLUTE: 3.07 E9/L (ref 1.8–7.3)
NEUTROPHILS RELATIVE PERCENT: 44.5 % (ref 43–80)
PDW BLD-RTO: 15.1 FL (ref 11.5–15)
PLATELET # BLD: 145 E9/L (ref 130–450)
PMV BLD AUTO: 12 FL (ref 7–12)
POTASSIUM SERPL-SCNC: 4.5 MMOL/L (ref 3.5–5)
PROTHROMBIN TIME: 14.4 SEC (ref 9.3–12.4)
RBC # BLD: 4.01 E12/L (ref 3.8–5.8)
SODIUM BLD-SCNC: 139 MMOL/L (ref 132–146)
TOTAL PROTEIN: 7.5 G/DL (ref 6.4–8.3)
WBC # BLD: 6.9 E9/L (ref 4.5–11.5)

## 2023-02-21 PROCEDURE — 80053 COMPREHEN METABOLIC PANEL: CPT

## 2023-02-21 PROCEDURE — 36415 COLL VENOUS BLD VENIPUNCTURE: CPT

## 2023-02-21 PROCEDURE — 76705 ECHO EXAM OF ABDOMEN: CPT

## 2023-02-21 PROCEDURE — 82105 ALPHA-FETOPROTEIN SERUM: CPT

## 2023-02-21 PROCEDURE — 85610 PROTHROMBIN TIME: CPT

## 2023-02-21 PROCEDURE — 85730 THROMBOPLASTIN TIME PARTIAL: CPT

## 2023-02-21 PROCEDURE — 85025 COMPLETE CBC W/AUTO DIFF WBC: CPT

## 2023-02-22 LAB — AFP-TUMOR MARKER: 5 NG/ML (ref 0–9)

## 2023-03-09 NOTE — TELEPHONE ENCOUNTER
Patient called to follow up on refill request.  I informed that RX is pending, awaiting response. Patient informed he is out of his medication and will be going out of town. I advised patient, in the future, please call the ofc prior to running out of his medication when he has 7-10 days left.      Last seen 2/16/2023  Next appt 3/30/2023  [facility]

## 2023-03-10 RX ORDER — DICYCLOMINE HYDROCHLORIDE 10 MG/1
CAPSULE ORAL
Qty: 120 CAPSULE | Refills: 0 | Status: SHIPPED | OUTPATIENT
Start: 2023-03-10

## 2023-04-02 RX ORDER — MULTIVIT,CALC,MINS/IRON/FOLIC 9MG-400MCG
TABLET ORAL
Qty: 30 TABLET | Refills: 5 | Status: SHIPPED | OUTPATIENT
Start: 2023-04-02

## 2023-06-07 DIAGNOSIS — F51.01 PRIMARY INSOMNIA: ICD-10-CM

## 2023-06-07 NOTE — TELEPHONE ENCOUNTER
----- Message from [facility] sent at 6/7/2023  9:28 AM EDT -----  Subject: Refill Request    QUESTIONS  Name of Medication? LORazepam (ATIVAN) 0.5 MG tablet  Patient-reported dosage and instructions? Take 1 tablet by mouth nightly   as needed for Anxiety. Max Daily Amount? 0.5 mg  How many days do you have left? 2  Preferred Pharmacy? [id number]  Pharmacy phone number (if available)? [phone]  ---------------------------------------------------------------------------  --------------  CALL BACK INFO  What is the best way for the office to contact you? OK to leave message on   voicemail  Preferred Call Back Phone Number? [phone]  ---------------------------------------------------------------------------  --------------  SCRIPT ANSWERS  Relationship to Patient?  Self

## 2023-06-08 RX ORDER — LORAZEPAM 0.5 MG/1
0.5 TABLET ORAL NIGHTLY PRN
Qty: 30 TABLET | Refills: 2 | Status: SHIPPED | OUTPATIENT
Start: 2023-06-08 | End: 2024-06-07

## 2023-06-23 ENCOUNTER — HOSPITAL ENCOUNTER (OUTPATIENT)
Age: 58
Discharge: HOME | End: 2023-06-23
Payer: MEDICAID

## 2023-06-23 LAB
ALBUMIN SERPL-MCNC: 3.8 G/DL (ref 3.5–5.2)
ALP SERPL-CCNC: 193 U/L (ref 40–129)
ALT SERPL-CCNC: 19 U/L (ref 0–40)
ANION GAP SERPL CALCULATED.3IONS-SCNC: 9 MMOL/L (ref 7–16)
APTT BLD: 37.9 SEC (ref 24.5–35.1)
AST SERPL-CCNC: 40 U/L (ref 0–39)
BASOPHILS # BLD: 0.09 E9/L (ref 0–0.2)
BASOPHILS NFR BLD: 1.3 % (ref 0–2)
BILIRUB SERPL-MCNC: 1.9 MG/DL (ref 0–1.2)
BUN SERPL-MCNC: 13 MG/DL (ref 6–20)
CALCIUM SERPL-MCNC: 9.4 MG/DL (ref 8.6–10.2)
CHLORIDE SERPL-SCNC: 101 MMOL/L (ref 98–107)
CO2 SERPL-SCNC: 27 MMOL/L (ref 22–29)
CREAT SERPL-MCNC: 0.9 MG/DL (ref 0.7–1.2)
EOSINOPHIL # BLD: 0.74 E9/L (ref 0.05–0.5)
EOSINOPHIL NFR BLD: 10.5 % (ref 0–6)
ERYTHROCYTE [DISTWIDTH] IN BLOOD BY AUTOMATED COUNT: 14.6 FL (ref 11.5–15)
GLUCOSE SERPL-MCNC: 113 MG/DL (ref 74–99)
HCT VFR BLD AUTO: 42 % (ref 37–54)
HGB BLD-MCNC: 14.4 G/DL (ref 12.5–16.5)
IMM GRANULOCYTES # BLD: 0.02 E9/L
IMM GRANULOCYTES NFR BLD: 0.3 % (ref 0–5)
INR BLD: 1.2
LYMPHOCYTES # BLD: 2.2 E9/L (ref 1.5–4)
LYMPHOCYTES NFR BLD: 31.2 % (ref 20–42)
MAGNESIUM SERPL-MCNC: 1.9 MG/DL (ref 1.6–2.6)
MCH RBC QN AUTO: 34 PG (ref 26–35)
MCHC RBC AUTO-ENTMCNC: 34.3 % (ref 32–34.5)
MCV RBC AUTO: 99.3 FL (ref 80–99.9)
MONOCYTES # BLD: 0.85 E9/L (ref 0.1–0.95)
MONOCYTES NFR BLD: 12 % (ref 2–12)
NEUTROPHILS # BLD: 3.16 E9/L (ref 1.8–7.3)
NEUTS SEG NFR BLD: 44.7 % (ref 43–80)
PLATELET # BLD AUTO: 121 E9/L (ref 130–450)
PMV BLD AUTO: 11.7 FL (ref 7–12)
POTASSIUM SERPL-SCNC: 4.1 MMOL/L (ref 3.5–5)
PROT SERPL-MCNC: 8.5 G/DL (ref 6.4–8.3)
PROTHROMBIN TIME: 13.4 SEC (ref 9.3–12.4)
RBC # BLD AUTO: 4.23 E12/L (ref 3.8–5.8)
SODIUM SERPL-SCNC: 137 MMOL/L (ref 132–146)
WBC # BLD: 7.1 E9/L (ref 4.5–11.5)

## 2023-06-23 PROCEDURE — 85730 THROMBOPLASTIN TIME PARTIAL: CPT

## 2023-06-23 PROCEDURE — 85610 PROTHROMBIN TIME: CPT

## 2023-06-23 PROCEDURE — 85025 COMPLETE CBC W/AUTO DIFF WBC: CPT

## 2023-06-23 PROCEDURE — 80053 COMPREHEN METABOLIC PANEL: CPT

## 2023-06-23 PROCEDURE — 36415 COLL VENOUS BLD VENIPUNCTURE: CPT

## 2023-06-23 PROCEDURE — 83735 ASSAY OF MAGNESIUM: CPT

## 2023-06-26 ENCOUNTER — OFFICE VISIT (OUTPATIENT)
Dept: FAMILY MEDICINE CLINIC | Age: 58
End: 2023-06-26
Payer: MEDICAID

## 2023-06-26 VITALS
HEART RATE: 80 BPM | DIASTOLIC BLOOD PRESSURE: 78 MMHG | OXYGEN SATURATION: 98 % | BODY MASS INDEX: 26.63 KG/M2 | WEIGHT: 186 LBS | SYSTOLIC BLOOD PRESSURE: 138 MMHG | HEIGHT: 70 IN | RESPIRATION RATE: 20 BRPM

## 2023-06-26 DIAGNOSIS — K74.60 CIRRHOSIS OF LIVER WITHOUT ASCITES, UNSPECIFIED HEPATIC CIRRHOSIS TYPE (HCC): ICD-10-CM

## 2023-06-26 DIAGNOSIS — K70.31 ALCOHOLIC CIRRHOSIS OF LIVER WITH ASCITES (HCC): ICD-10-CM

## 2023-06-26 DIAGNOSIS — I10 PRIMARY HYPERTENSION: Primary | ICD-10-CM

## 2023-06-26 DIAGNOSIS — G25.81 RESTLESS LEG SYNDROME: ICD-10-CM

## 2023-06-26 PROCEDURE — 3017F COLORECTAL CA SCREEN DOC REV: CPT | Performed by: FAMILY MEDICINE

## 2023-06-26 PROCEDURE — 3078F DIAST BP <80 MM HG: CPT | Performed by: FAMILY MEDICINE

## 2023-06-26 PROCEDURE — G8427 DOCREV CUR MEDS BY ELIG CLIN: HCPCS | Performed by: FAMILY MEDICINE

## 2023-06-26 PROCEDURE — G8419 CALC BMI OUT NRM PARAM NOF/U: HCPCS | Performed by: FAMILY MEDICINE

## 2023-06-26 PROCEDURE — 4004F PT TOBACCO SCREEN RCVD TLK: CPT | Performed by: FAMILY MEDICINE

## 2023-06-26 PROCEDURE — 99214 OFFICE O/P EST MOD 30 MIN: CPT | Performed by: FAMILY MEDICINE

## 2023-06-26 PROCEDURE — 3075F SYST BP GE 130 - 139MM HG: CPT | Performed by: FAMILY MEDICINE

## 2023-06-26 RX ORDER — SPIRONOLACTONE 100 MG/1
100 TABLET, FILM COATED ORAL DAILY
Qty: 30 TABLET | Refills: 3
Start: 2023-06-26

## 2023-06-26 RX ORDER — GINSENG 100 MG
CAPSULE ORAL
Qty: 28 G | Refills: 3 | Status: SHIPPED | OUTPATIENT
Start: 2023-06-26 | End: 2023-07-06

## 2023-06-26 RX ORDER — ROPINIROLE 1 MG/1
1 TABLET, FILM COATED ORAL NIGHTLY
Qty: 30 TABLET | Refills: 3 | Status: SHIPPED | OUTPATIENT
Start: 2023-06-26

## 2023-06-26 RX ORDER — NADOLOL 40 MG/1
40 TABLET ORAL DAILY
Qty: 30 TABLET | Refills: 3
Start: 2023-06-26

## 2023-06-26 ASSESSMENT — ENCOUNTER SYMPTOMS
SHORTNESS OF BREATH: 0
NAUSEA: 0
DIARRHEA: 0
VOMITING: 0

## 2023-08-01 ENCOUNTER — OFFICE VISIT (OUTPATIENT)
Dept: FAMILY MEDICINE CLINIC | Age: 58
End: 2023-08-01
Payer: MEDICAID

## 2023-08-01 VITALS
DIASTOLIC BLOOD PRESSURE: 72 MMHG | OXYGEN SATURATION: 99 % | RESPIRATION RATE: 20 BRPM | HEIGHT: 70 IN | HEART RATE: 90 BPM | WEIGHT: 186 LBS | SYSTOLIC BLOOD PRESSURE: 138 MMHG | BODY MASS INDEX: 26.63 KG/M2

## 2023-08-01 DIAGNOSIS — G25.81 RESTLESS LEG SYNDROME: ICD-10-CM

## 2023-08-01 PROCEDURE — 3078F DIAST BP <80 MM HG: CPT | Performed by: FAMILY MEDICINE

## 2023-08-01 PROCEDURE — G8427 DOCREV CUR MEDS BY ELIG CLIN: HCPCS | Performed by: FAMILY MEDICINE

## 2023-08-01 PROCEDURE — 3017F COLORECTAL CA SCREEN DOC REV: CPT | Performed by: FAMILY MEDICINE

## 2023-08-01 PROCEDURE — 99214 OFFICE O/P EST MOD 30 MIN: CPT | Performed by: FAMILY MEDICINE

## 2023-08-01 PROCEDURE — 3075F SYST BP GE 130 - 139MM HG: CPT | Performed by: FAMILY MEDICINE

## 2023-08-01 PROCEDURE — 4004F PT TOBACCO SCREEN RCVD TLK: CPT | Performed by: FAMILY MEDICINE

## 2023-08-01 PROCEDURE — G8419 CALC BMI OUT NRM PARAM NOF/U: HCPCS | Performed by: FAMILY MEDICINE

## 2023-08-01 RX ORDER — ROPINIROLE 2 MG/1
2 TABLET, FILM COATED ORAL NIGHTLY
Qty: 30 TABLET | Refills: 3 | Status: SHIPPED | OUTPATIENT
Start: 2023-08-01

## 2023-08-01 ASSESSMENT — ENCOUNTER SYMPTOMS
VOMITING: 0
NAUSEA: 0
DIARRHEA: 0
SHORTNESS OF BREATH: 0

## 2023-08-01 NOTE — PROGRESS NOTES
pantoprazole (PROTONIX) 40 MG tablet take 1 tablet by mouth every morning ON EMPTY STOMACH 30 tablet 5    triamcinolone (KENALOG) 0.1 % cream Apply topically 2 times daily for up to 2 weeks then as needed for flareups. 80 g 5    Magnesium Oxide 420 MG TABS Take 420 mg by mouth daily 30 tablet 5    Multiple Vitamin (THEREMS) TABS Take 1 tablet by mouth daily 30 tablet 5    furosemide (LASIX) 40 MG tablet Take 1 tablet by mouth daily 60 tablet 3    potassium chloride (KLOR-CON M) 20 MEQ extended release tablet Take 2 tablets by mouth daily 180 tablet 1     No current facility-administered medications for this visit. Wt Readings from Last 3 Encounters:   08/01/23 186 lb (84.4 kg)   06/26/23 186 lb (84.4 kg)   02/16/23 177 lb (80.3 kg)                   Vitals:    08/01/23 1317   BP: 138/72   Pulse: 90   Resp: 20   SpO2: 99%       Physical Exam  Vitals reviewed. Constitutional:       General: He is not in acute distress. Appearance: He is well-developed. Neck:      Vascular: No carotid bruit. Cardiovascular:      Rate and Rhythm: Normal rate and regular rhythm. Heart sounds: Normal heart sounds. No murmur heard. No gallop. Pulmonary:      Effort: Pulmonary effort is normal.      Breath sounds: Normal breath sounds. No wheezing or rales. Abdominal:      General: Bowel sounds are normal. There is no distension. Palpations: Abdomen is soft. Tenderness: There is no abdominal tenderness. Musculoskeletal:      Cervical back: Neck supple. Right lower leg: No edema. Left lower leg: No edema. Skin:     General: Skin is warm and dry. Neurological:      Mental Status: He is alert and oriented to person, place, and time. ASSESSMENT/PLAN  Martínez Fisher was seen today for restless leg(s). Diagnoses and all orders for this visit:    Restless leg syndrome  -     increase rOPINIRole (REQUIP) 2 MG tablet;  Take 1 tablet by mouth nightly          /MyChart follow up if tests

## 2023-08-24 ENCOUNTER — TELEPHONE (OUTPATIENT)
Dept: FAMILY MEDICINE CLINIC | Age: 58
End: 2023-08-24

## 2023-08-24 DIAGNOSIS — I10 PRIMARY HYPERTENSION: ICD-10-CM

## 2023-08-24 NOTE — TELEPHONE ENCOUNTER
Patient called for refill. Checking with the pharmacy they stated they did not get the Rx for nadolol electronically on 6.26.2023 So a verbal was given to the pharmacy.

## 2023-09-12 ENCOUNTER — OFFICE VISIT (OUTPATIENT)
Dept: FAMILY MEDICINE CLINIC | Age: 58
End: 2023-09-12
Payer: MEDICAID

## 2023-09-12 VITALS
OXYGEN SATURATION: 100 % | DIASTOLIC BLOOD PRESSURE: 94 MMHG | SYSTOLIC BLOOD PRESSURE: 158 MMHG | HEIGHT: 70 IN | BODY MASS INDEX: 26.63 KG/M2 | HEART RATE: 72 BPM | WEIGHT: 186 LBS | RESPIRATION RATE: 20 BRPM

## 2023-09-12 DIAGNOSIS — G25.81 RESTLESS LEG SYNDROME: Primary | ICD-10-CM

## 2023-09-12 DIAGNOSIS — I10 PRIMARY HYPERTENSION: ICD-10-CM

## 2023-09-12 DIAGNOSIS — F51.01 PRIMARY INSOMNIA: ICD-10-CM

## 2023-09-12 PROCEDURE — 99214 OFFICE O/P EST MOD 30 MIN: CPT | Performed by: FAMILY MEDICINE

## 2023-09-12 PROCEDURE — 3017F COLORECTAL CA SCREEN DOC REV: CPT | Performed by: FAMILY MEDICINE

## 2023-09-12 PROCEDURE — G8419 CALC BMI OUT NRM PARAM NOF/U: HCPCS | Performed by: FAMILY MEDICINE

## 2023-09-12 PROCEDURE — 3078F DIAST BP <80 MM HG: CPT | Performed by: FAMILY MEDICINE

## 2023-09-12 PROCEDURE — 4004F PT TOBACCO SCREEN RCVD TLK: CPT | Performed by: FAMILY MEDICINE

## 2023-09-12 PROCEDURE — 3074F SYST BP LT 130 MM HG: CPT | Performed by: FAMILY MEDICINE

## 2023-09-12 PROCEDURE — G8427 DOCREV CUR MEDS BY ELIG CLIN: HCPCS | Performed by: FAMILY MEDICINE

## 2023-09-12 RX ORDER — LORAZEPAM 0.5 MG/1
0.5 TABLET ORAL NIGHTLY PRN
Qty: 30 TABLET | Refills: 2 | Status: SHIPPED | OUTPATIENT
Start: 2023-09-12 | End: 2024-09-11

## 2023-09-12 ASSESSMENT — ENCOUNTER SYMPTOMS
DIARRHEA: 0
NAUSEA: 0
SHORTNESS OF BREATH: 0
VOMITING: 0

## 2023-09-12 NOTE — PROGRESS NOTES
[address] [facility], [address]   [address] [facility]   Dorian Crowder MD     Patient: Saqib Hall Birth: 1965  Visit Date: 9/12/23    Karrie Vasquez is a 66y.o. year old male here today for   Chief Complaint   Patient presents with    Restless Leg(s)    Swelling     In feet, unable to wear shoes       HPI  Patient doing well on current regimen for restless leg syndrome. Patient doing well on current regimen for hypertension. Patient has had balance difficulty. Worse with prolonged standing and walking. Patient denies any recent alcohol intake. Has history of alcoholism and liver cirrhosis. Recent lab results reviewed, including CMP, CBC, TSH, and lipid panel which are not remarkable. Review of Systems   Eyes:  Positive for visual disturbance (blurry vision). Respiratory:  Negative for shortness of breath. Cardiovascular:  Positive for leg swelling (feet and ankles). Negative for chest pain and palpitations. Gastrointestinal:  Negative for diarrhea, nausea and vomiting. Genitourinary:  Negative for difficulty urinating, dysuria and frequency. Skin:  Negative for rash. Skin on feet is rougher than usual   Neurological:  Positive for dizziness (comes and goes with movement). Psychiatric/Behavioral:  Negative for dysphoric mood. Past medical, surgical, social and/or family historyreviewed, updated as needed, and are non-contributory (unless otherwise stated). Medications, allergies, and problem list also reviewed and updated as needed in patient's record.      Current Outpatient Medications   Medication Sig Dispense Refill    nadolol (CORGARD) 40 MG tablet Take 1 tablet by mouth daily 30 tablet 3    spironolactone (ALDACTONE) 100 MG tablet Take 1 tablet by mouth daily 30 tablet 3    rOPINIRole (REQUIP) 2 MG tablet Take 1 tablet by mouth nightly 30 tablet 3    LORazepam (ATIVAN) 0.5 MG tablet Take 1 tablet by

## 2023-09-13 ENCOUNTER — TELEPHONE (OUTPATIENT)
Dept: FAMILY MEDICINE CLINIC | Age: 58
End: 2023-09-13

## 2023-09-13 NOTE — TELEPHONE ENCOUNTER
Patient called he was seen in the office 9.12.23.  pt stated he was to get Rx for a cream for the top of his feet. Please advise.   Last seen 9/12/2023  Next appt 12/12/2023  MAO/Maureen HUYNH

## 2023-09-14 RX ORDER — CLOTRIMAZOLE AND BETAMETHASONE DIPROPIONATE 10; .64 MG/G; MG/G
CREAM TOPICAL
Qty: 45 G | Refills: 0 | Status: SHIPPED | OUTPATIENT
Start: 2023-09-14

## 2023-09-16 RX ORDER — SPIRONOLACTONE 100 MG/1
100 TABLET, FILM COATED ORAL DAILY
Qty: 30 TABLET | Refills: 3
Start: 2023-09-16

## 2023-09-16 RX ORDER — ROPINIROLE 2 MG/1
2 TABLET, FILM COATED ORAL NIGHTLY
Qty: 30 TABLET | Refills: 3 | Status: SHIPPED
Start: 2023-09-16

## 2023-09-16 RX ORDER — NADOLOL 40 MG/1
40 TABLET ORAL DAILY
Qty: 30 TABLET | Refills: 3
Start: 2023-09-16

## 2024-01-01 ENCOUNTER — TELEPHONE (OUTPATIENT)
Dept: FAMILY MEDICINE CLINIC | Age: 59
End: 2024-01-01

## 2024-01-01 ENCOUNTER — OFFICE VISIT (OUTPATIENT)
Dept: FAMILY MEDICINE CLINIC | Age: 59
End: 2024-01-01
Payer: MEDICAID

## 2024-01-01 VITALS
OXYGEN SATURATION: 96 % | RESPIRATION RATE: 20 BRPM | WEIGHT: 186 LBS | HEIGHT: 70 IN | DIASTOLIC BLOOD PRESSURE: 70 MMHG | BODY MASS INDEX: 26.63 KG/M2 | SYSTOLIC BLOOD PRESSURE: 124 MMHG | HEART RATE: 84 BPM

## 2024-01-01 DIAGNOSIS — F51.01 PRIMARY INSOMNIA: ICD-10-CM

## 2024-01-01 DIAGNOSIS — I10 PRIMARY HYPERTENSION: Primary | ICD-10-CM

## 2024-01-01 PROCEDURE — 3074F SYST BP LT 130 MM HG: CPT | Performed by: FAMILY MEDICINE

## 2024-01-01 PROCEDURE — G8419 CALC BMI OUT NRM PARAM NOF/U: HCPCS | Performed by: FAMILY MEDICINE

## 2024-01-01 PROCEDURE — 99214 OFFICE O/P EST MOD 30 MIN: CPT | Performed by: FAMILY MEDICINE

## 2024-01-01 PROCEDURE — 3017F COLORECTAL CA SCREEN DOC REV: CPT | Performed by: FAMILY MEDICINE

## 2024-01-01 PROCEDURE — 4004F PT TOBACCO SCREEN RCVD TLK: CPT | Performed by: FAMILY MEDICINE

## 2024-01-01 PROCEDURE — 3078F DIAST BP <80 MM HG: CPT | Performed by: FAMILY MEDICINE

## 2024-01-01 PROCEDURE — G8427 DOCREV CUR MEDS BY ELIG CLIN: HCPCS | Performed by: FAMILY MEDICINE

## 2024-01-01 RX ORDER — LORAZEPAM 0.5 MG/1
0.5 TABLET ORAL NIGHTLY PRN
Qty: 30 TABLET | Refills: 2 | Status: SHIPPED
Start: 2024-01-01 | End: 2024-05-04

## 2024-01-01 RX ORDER — SPIRONOLACTONE 100 MG/1
100 TABLET, FILM COATED ORAL DAILY
Qty: 30 TABLET | Refills: 3
Start: 2024-01-01 | End: 2024-05-04

## 2024-01-01 RX ORDER — NADOLOL 40 MG/1
40 TABLET ORAL DAILY
Qty: 30 TABLET | Refills: 5
Start: 2024-01-01 | End: 2024-05-04

## 2024-01-01 SDOH — ECONOMIC STABILITY: FOOD INSECURITY: WITHIN THE PAST 12 MONTHS, THE FOOD YOU BOUGHT JUST DIDN'T LAST AND YOU DIDN'T HAVE MONEY TO GET MORE.: NEVER TRUE

## 2024-01-01 SDOH — ECONOMIC STABILITY: FOOD INSECURITY: WITHIN THE PAST 12 MONTHS, YOU WORRIED THAT YOUR FOOD WOULD RUN OUT BEFORE YOU GOT MONEY TO BUY MORE.: NEVER TRUE

## 2024-01-01 SDOH — ECONOMIC STABILITY: INCOME INSECURITY: HOW HARD IS IT FOR YOU TO PAY FOR THE VERY BASICS LIKE FOOD, HOUSING, MEDICAL CARE, AND HEATING?: NOT HARD AT ALL

## 2024-01-01 ASSESSMENT — ENCOUNTER SYMPTOMS
SHORTNESS OF BREATH: 0
NAUSEA: 0
VOMITING: 0
DIARRHEA: 0

## 2024-01-15 DIAGNOSIS — F51.01 PRIMARY INSOMNIA: ICD-10-CM

## 2024-01-15 RX ORDER — LORAZEPAM 0.5 MG/1
0.5 TABLET ORAL NIGHTLY PRN
Qty: 30 TABLET | Refills: 2 | Status: SHIPPED | OUTPATIENT
Start: 2024-01-15 | End: 2025-01-14

## 2024-01-15 NOTE — TELEPHONE ENCOUNTER
Patient called for a refill.  I informed him that he's due for his HTN appt.  Patient was agreeable and scheduled 1/22/24 at 1:45 pm.     Last seen 9/12/2023  Next appt 1/22/2024  [facility]

## 2024-01-18 RX ORDER — MAGNESIUM OXIDE 420 MG/1
1 TABLET ORAL DAILY
Qty: 30 TABLET | Refills: 5 | Status: SHIPPED | OUTPATIENT
Start: 2024-01-18

## 2024-01-22 ENCOUNTER — OFFICE VISIT (OUTPATIENT)
Dept: FAMILY MEDICINE CLINIC | Age: 59
End: 2024-01-22
Payer: MEDICAID

## 2024-01-22 VITALS
OXYGEN SATURATION: 99 % | RESPIRATION RATE: 20 BRPM | HEIGHT: 70 IN | DIASTOLIC BLOOD PRESSURE: 72 MMHG | HEART RATE: 84 BPM | BODY MASS INDEX: 26.2 KG/M2 | WEIGHT: 183 LBS | SYSTOLIC BLOOD PRESSURE: 138 MMHG

## 2024-01-22 DIAGNOSIS — I10 PRIMARY HYPERTENSION: Primary | ICD-10-CM

## 2024-01-22 DIAGNOSIS — I73.9 CLAUDICATION OF CALF MUSCLES (HCC): ICD-10-CM

## 2024-01-22 DIAGNOSIS — K70.31 ALCOHOLIC CIRRHOSIS OF LIVER WITH ASCITES (HCC): ICD-10-CM

## 2024-01-22 DIAGNOSIS — Z12.5 PROSTATE CANCER SCREENING: ICD-10-CM

## 2024-01-22 DIAGNOSIS — J32.9 PURULENT POSTNASAL DRAINAGE: ICD-10-CM

## 2024-01-22 PROCEDURE — G8484 FLU IMMUNIZE NO ADMIN: HCPCS | Performed by: FAMILY MEDICINE

## 2024-01-22 PROCEDURE — 3017F COLORECTAL CA SCREEN DOC REV: CPT | Performed by: FAMILY MEDICINE

## 2024-01-22 PROCEDURE — G8419 CALC BMI OUT NRM PARAM NOF/U: HCPCS | Performed by: FAMILY MEDICINE

## 2024-01-22 PROCEDURE — 99214 OFFICE O/P EST MOD 30 MIN: CPT | Performed by: FAMILY MEDICINE

## 2024-01-22 PROCEDURE — 3075F SYST BP GE 130 - 139MM HG: CPT | Performed by: FAMILY MEDICINE

## 2024-01-22 PROCEDURE — G8427 DOCREV CUR MEDS BY ELIG CLIN: HCPCS | Performed by: FAMILY MEDICINE

## 2024-01-22 PROCEDURE — 4004F PT TOBACCO SCREEN RCVD TLK: CPT | Performed by: FAMILY MEDICINE

## 2024-01-22 PROCEDURE — 3078F DIAST BP <80 MM HG: CPT | Performed by: FAMILY MEDICINE

## 2024-01-22 RX ORDER — LORATADINE 10 MG/1
10 TABLET ORAL DAILY
Qty: 30 TABLET | Refills: 3 | Status: SHIPPED | OUTPATIENT
Start: 2024-01-22

## 2024-01-22 RX ORDER — NADOLOL 40 MG/1
40 TABLET ORAL DAILY
Qty: 30 TABLET | Refills: 5 | Status: SHIPPED | OUTPATIENT
Start: 2024-01-22

## 2024-01-22 RX ORDER — PANTOPRAZOLE SODIUM 40 MG/1
TABLET, DELAYED RELEASE ORAL
Qty: 30 TABLET | Refills: 5 | Status: SHIPPED | OUTPATIENT
Start: 2024-01-22

## 2024-01-22 ASSESSMENT — PATIENT HEALTH QUESTIONNAIRE - PHQ9
SUM OF ALL RESPONSES TO PHQ QUESTIONS 1-9: 0
2. FEELING DOWN, DEPRESSED OR HOPELESS: 0
1. LITTLE INTEREST OR PLEASURE IN DOING THINGS: 0
SUM OF ALL RESPONSES TO PHQ9 QUESTIONS 1 & 2: 0
SUM OF ALL RESPONSES TO PHQ QUESTIONS 1-9: 0

## 2024-01-22 ASSESSMENT — ENCOUNTER SYMPTOMS
DIARRHEA: 1
SHORTNESS OF BREATH: 0
NAUSEA: 0

## 2024-01-22 NOTE — PROGRESS NOTES
hypertension and leg pain.    Diagnoses and all orders for this visit:    Primary hypertension  -     nadolol (CORGARD) 40 MG tablet; Take 1 tablet by mouth daily  -     CBC; Future  -     Comprehensive Metabolic Panel; Future  -     Lipid Panel; Future  -     TSH; Future    Claudication of calf muscles (HCC)  -     Vascular ankle brachial index (JIMMY); Future    Alcoholic cirrhosis of liver with ascites (HCC)        -     Stable; will assess in 6 months    Purulent postnasal drainage  -     loratadine (CLARITIN) 10 MG tablet; Take 1 tablet by mouth daily    Prostate cancer screening  -     PSA Screening; Future          Phone/MyChart follow up iftests abnormal.    Return in about 6 weeks (around 3/4/2024) for f/u circulation test results., or sooner if necessary.            Ismael Luque MD     Educational materials and/or home exercises printed for patient's review and wereincluded in patient instructions on his/her After Visit Summary and given to patient at the end of visit.      Counseled regarding above diagnosis, including possible risks andcomplications,  especially if left uncontrolled.    Counseled regarding the possible side effects, risks, benefits and alternatives to treatment; patient and/or guardian verbalizes understanding, agrees, feelscomfortable with and wishes to proceed with above treatment plan.    Advised patient to call with any new medication issues, and read all Rx info from pharmacy to assure aware of all possible risks and side effects ofmedication before taking.    Reviewed age and gender appropriate health screening exams and vaccinations.  Advised patient regarding importance of keeping up with recommended health maintenance and to schedule as soonas possible if overdue, as this is important in assessing for undiagnosed pathology, especially cancer, as well as protecting against potentially harmful/life threatening disease.        Patient and/or guardianverbalizes

## 2024-02-09 ENCOUNTER — HOSPITAL ENCOUNTER (OUTPATIENT)
Age: 59
Discharge: HOME | End: 2024-02-09
Payer: MEDICAID

## 2024-02-09 ENCOUNTER — HOSPITAL ENCOUNTER (OUTPATIENT)
Dept: INTERVENTIONAL RADIOLOGY/VASCULAR | Age: 59
End: 2024-02-09
Payer: MEDICAID

## 2024-02-09 DIAGNOSIS — Z12.5 PROSTATE CANCER SCREENING: ICD-10-CM

## 2024-02-09 DIAGNOSIS — I73.9 CLAUDICATION OF CALF MUSCLES (HCC): ICD-10-CM

## 2024-02-09 DIAGNOSIS — I10 PRIMARY HYPERTENSION: ICD-10-CM

## 2024-02-09 LAB
ALBUMIN SERPL-MCNC: 3.8 G/DL (ref 3.5–5.2)
ALP SERPL-CCNC: 164 U/L (ref 40–129)
ALT SERPL-CCNC: 16 U/L (ref 0–40)
AMMONIA PLAS-SCNC: 52 UMOL/L (ref 16–60)
ANION GAP SERPL CALCULATED.3IONS-SCNC: 11 MMOL/L (ref 7–16)
AST SERPL-CCNC: 40 U/L (ref 0–39)
BASOPHILS # BLD: 0.06 K/UL (ref 0–0.2)
BASOPHILS NFR BLD: 1 % (ref 0–2)
BILIRUB SERPL-MCNC: 1.7 MG/DL (ref 0–1.2)
BUN SERPL-MCNC: 17 MG/DL (ref 6–20)
CALCIUM SERPL-MCNC: 9.5 MG/DL (ref 8.6–10.2)
CHLORIDE SERPL-SCNC: 100 MMOL/L (ref 98–107)
CHOLEST SERPL-MCNC: 195 MG/DL
CK SERPL-CCNC: 84 U/L (ref 20–200)
CO2 SERPL-SCNC: 25 MMOL/L (ref 22–29)
CREAT SERPL-MCNC: 1.1 MG/DL (ref 0.7–1.2)
EOSINOPHIL # BLD: 0.24 K/UL (ref 0.05–0.5)
EOSINOPHILS RELATIVE PERCENT: 5 % (ref 0–6)
ERYTHROCYTE [DISTWIDTH] IN BLOOD BY AUTOMATED COUNT: 14.2 % (ref 11.5–15)
FERRITIN SERPL-MCNC: 378 NG/ML
GFR SERPL CREATININE-BSD FRML MDRD: >60 ML/MIN/1.73M2
GLUCOSE SERPL-MCNC: 102 MG/DL (ref 74–99)
HCT VFR BLD AUTO: 45.1 % (ref 37–54)
HDLC SERPL-MCNC: 38 MG/DL
HGB BLD-MCNC: 15.1 G/DL (ref 12.5–16.5)
IMM GRANULOCYTES # BLD AUTO: <0.03 K/UL (ref 0–0.58)
IMM GRANULOCYTES NFR BLD: 0 % (ref 0–5)
INR PPP: 1.2
IRON SATN MFR SERPL: 44 % (ref 20–55)
IRON SERPL-MCNC: 106 UG/DL (ref 59–158)
LDLC SERPL CALC-MCNC: 138 MG/DL
LYMPHOCYTES NFR BLD: 1.45 K/UL (ref 1.5–4)
LYMPHOCYTES RELATIVE PERCENT: 28 % (ref 20–42)
MAGNESIUM SERPL-MCNC: 2.2 MG/DL (ref 1.6–2.6)
MCH RBC QN AUTO: 33 PG (ref 26–35)
MCHC RBC AUTO-ENTMCNC: 33.5 G/DL (ref 32–34.5)
MCV RBC AUTO: 98.7 FL (ref 80–99.9)
MONOCYTES NFR BLD: 0.53 K/UL (ref 0.1–0.95)
MONOCYTES NFR BLD: 10 % (ref 2–12)
NEUTROPHILS NFR BLD: 56 % (ref 43–80)
NEUTS SEG NFR BLD: 2.88 K/UL (ref 1.8–7.3)
PARTIAL THROMBOPLASTIN TIME: 39.5 SEC (ref 24.5–35.1)
PLATELET, FLUORESCENCE: 121 K/UL (ref 130–450)
PMV BLD AUTO: 11.2 FL (ref 7–12)
POTASSIUM SERPL-SCNC: 5 MMOL/L (ref 3.5–5)
PROT SERPL-MCNC: 8.3 G/DL (ref 6.4–8.3)
PROTHROMBIN TIME: 13.3 SEC (ref 9.3–12.4)
PSA SERPL-MCNC: 0.26 NG/ML (ref 0–4)
RBC # BLD AUTO: 4.57 M/UL (ref 3.8–5.8)
SODIUM SERPL-SCNC: 136 MMOL/L (ref 132–146)
TIBC SERPL-MCNC: 240 UG/DL (ref 250–450)
TRIGL SERPL-MCNC: 95 MG/DL
TSH SERPL DL<=0.05 MIU/L-ACNC: 2.5 UIU/ML (ref 0.27–4.2)
VLDLC SERPL CALC-MCNC: 19 MG/DL
WBC OTHER # BLD: 5.2 K/UL (ref 4.5–11.5)

## 2024-02-09 PROCEDURE — 82105 ALPHA-FETOPROTEIN SERUM: CPT

## 2024-02-09 PROCEDURE — 85610 PROTHROMBIN TIME: CPT

## 2024-02-09 PROCEDURE — 82140 ASSAY OF AMMONIA: CPT

## 2024-02-09 PROCEDURE — 84443 ASSAY THYROID STIM HORMONE: CPT

## 2024-02-09 PROCEDURE — 85730 THROMBOPLASTIN TIME PARTIAL: CPT

## 2024-02-09 PROCEDURE — 36415 COLL VENOUS BLD VENIPUNCTURE: CPT

## 2024-02-09 PROCEDURE — 82550 ASSAY OF CK (CPK): CPT

## 2024-02-09 PROCEDURE — 80061 LIPID PANEL: CPT

## 2024-02-09 PROCEDURE — 80053 COMPREHEN METABOLIC PANEL: CPT

## 2024-02-09 PROCEDURE — G0103 PSA SCREENING: HCPCS

## 2024-02-09 PROCEDURE — 83550 IRON BINDING TEST: CPT

## 2024-02-09 PROCEDURE — 83540 ASSAY OF IRON: CPT

## 2024-02-09 PROCEDURE — 82728 ASSAY OF FERRITIN: CPT

## 2024-02-09 PROCEDURE — 83735 ASSAY OF MAGNESIUM: CPT

## 2024-02-09 PROCEDURE — 85025 COMPLETE CBC W/AUTO DIFF WBC: CPT

## 2024-02-09 PROCEDURE — 93922 UPR/L XTREMITY ART 2 LEVELS: CPT

## 2024-02-13 LAB — AFP SERPL-MCNC: 5.1 UG/L

## 2024-03-25 ENCOUNTER — TELEPHONE (OUTPATIENT)
Dept: FAMILY MEDICINE CLINIC | Age: 59
End: 2024-03-25

## 2024-03-25 DIAGNOSIS — I10 PRIMARY HYPERTENSION: ICD-10-CM

## 2024-03-25 NOTE — TELEPHONE ENCOUNTER
Pt called needs this Rx to go the [facility] on [address]  Last seen 1/22/2024  Next appt Visit date not found  RA/Abbie Dunn

## 2024-03-26 RX ORDER — NADOLOL 40 MG/1
40 TABLET ORAL DAILY
Qty: 30 TABLET | Refills: 5 | Status: SHIPPED | OUTPATIENT
Start: 2024-03-26

## 2024-04-30 NOTE — PROGRESS NOTES
nadolol (CORGARD) 40 MG tablet; Take 1 tablet by mouth daily    Primary insomnia  -     LORazepam (ATIVAN) 0.5 MG tablet; Take 1 tablet by mouth nightly as needed for Anxiety. Max Daily Amount: 0.5 mg            Phone/MyChart follow up iftests abnormal.    Return in about 3 months (around 7/30/2024) for hypertension., or sooner if necessary.            Ismael Luque MD     Educational materials and/or home exercises printed for patient's review and wereincluded in patient instructions on his/her After Visit Summary and given to patient at the end of visit.      Counseled regarding above diagnosis, including possible risks andcomplications,  especially if left uncontrolled.    Counseled regarding the possible side effects, risks, benefits and alternatives to treatment; patient and/or guardian verbalizes understanding, agrees, feelscomfortable with and wishes to proceed with above treatment plan.    Advised patient to call with any new medication issues, and read all Rx info from pharmacy to assure aware of all possible risks and side effects ofmedication before taking.    Reviewed age and gender appropriate health screening exams and vaccinations.  Advised patient regarding importance of keeping up with recommended health maintenance and to schedule as soonas possible if overdue, as this is important in assessing for undiagnosed pathology, especially cancer, as well as protecting against potentially harmful/life threatening disease.        Patient and/or guardianverbalizes understanding and agrees with above counseling, assessment and plan.    All questions answered.

## 2024-05-03 NOTE — TELEPHONE ENCOUNTER
[facility] called to ask if Dr. Luque would sign the Death Certificate for patient who was found  on his couch this morning.  Foster informed me that the police were there and no foul play was indicated and that the 's office will not be involved.  Foster asked to be called back on his personal cell phone, [phone].  I informed him that Dr. Luque is not in the ofc on Friday.    Last seen 2024  Next appt

## 2024-05-06 NOTE — TELEPHONE ENCOUNTER
I returned Foster's call and left a detailed  msg informing him that Dr. Luque will sign the Death Certificate, as noted.